# Patient Record
Sex: FEMALE | Race: OTHER
[De-identification: names, ages, dates, MRNs, and addresses within clinical notes are randomized per-mention and may not be internally consistent; named-entity substitution may affect disease eponyms.]

---

## 2019-10-29 ENCOUNTER — HOSPITAL ENCOUNTER (INPATIENT)
Dept: HOSPITAL 72 - EMR | Age: 69
LOS: 4 days | Discharge: HOME HEALTH | DRG: 140 | End: 2019-11-02
Payer: MEDICARE

## 2019-10-29 VITALS — SYSTOLIC BLOOD PRESSURE: 144 MMHG | DIASTOLIC BLOOD PRESSURE: 83 MMHG

## 2019-10-29 VITALS — HEIGHT: 64 IN | BODY MASS INDEX: 50.02 KG/M2 | WEIGHT: 293 LBS

## 2019-10-29 DIAGNOSIS — N17.9: ICD-10-CM

## 2019-10-29 DIAGNOSIS — R73.9: ICD-10-CM

## 2019-10-29 DIAGNOSIS — I10: ICD-10-CM

## 2019-10-29 DIAGNOSIS — D72.829: ICD-10-CM

## 2019-10-29 DIAGNOSIS — J44.1: Primary | ICD-10-CM

## 2019-10-29 DIAGNOSIS — E66.01: ICD-10-CM

## 2019-10-29 DIAGNOSIS — T38.0X5A: ICD-10-CM

## 2019-10-29 DIAGNOSIS — Z87.891: ICD-10-CM

## 2019-10-29 LAB
ADD MANUAL DIFF: NO
ALBUMIN SERPL-MCNC: 3.1 G/DL (ref 3.4–5)
ALBUMIN/GLOB SERPL: 0.6 {RATIO} (ref 1–2.7)
ALP SERPL-CCNC: 110 U/L (ref 46–116)
ALT SERPL-CCNC: 17 U/L (ref 12–78)
ANION GAP SERPL CALC-SCNC: 7 MMOL/L (ref 5–15)
APTT BLD: 31 SEC (ref 23–33)
AST SERPL-CCNC: 13 U/L (ref 15–37)
BASOPHILS NFR BLD AUTO: 0.9 % (ref 0–2)
BILIRUB SERPL-MCNC: 0.2 MG/DL (ref 0.2–1)
BUN SERPL-MCNC: 16 MG/DL (ref 7–18)
CALCIUM SERPL-MCNC: 9.5 MG/DL (ref 8.5–10.1)
CHLORIDE SERPL-SCNC: 104 MMOL/L (ref 98–107)
CK SERPL-CCNC: 47 U/L (ref 26–308)
CO2 SERPL-SCNC: 29 MMOL/L (ref 21–32)
CREAT SERPL-MCNC: 1.1 MG/DL (ref 0.55–1.3)
EOSINOPHIL NFR BLD AUTO: 1.4 % (ref 0–3)
ERYTHROCYTE [DISTWIDTH] IN BLOOD BY AUTOMATED COUNT: 13 % (ref 11.6–14.8)
GLOBULIN SER-MCNC: 5.2 G/DL
HCT VFR BLD CALC: 36.9 % (ref 37–47)
HGB BLD-MCNC: 11.9 G/DL (ref 12–16)
INR PPP: 1 (ref 0.9–1.1)
LYMPHOCYTES NFR BLD AUTO: 12.3 % (ref 20–45)
MCV RBC AUTO: 83 FL (ref 80–99)
MONOCYTES NFR BLD AUTO: 4.1 % (ref 1–10)
NEUTROPHILS NFR BLD AUTO: 81.3 % (ref 45–75)
PLATELET # BLD: 374 K/UL (ref 150–450)
POTASSIUM SERPL-SCNC: 4.2 MMOL/L (ref 3.5–5.1)
RBC # BLD AUTO: 4.42 M/UL (ref 4.2–5.4)
SODIUM SERPL-SCNC: 140 MMOL/L (ref 136–145)
WBC # BLD AUTO: 13.1 K/UL (ref 4.8–10.8)

## 2019-10-29 PROCEDURE — 94640 AIRWAY INHALATION TREATMENT: CPT

## 2019-10-29 PROCEDURE — 99285 EMERGENCY DEPT VISIT HI MDM: CPT

## 2019-10-29 PROCEDURE — 83605 ASSAY OF LACTIC ACID: CPT

## 2019-10-29 PROCEDURE — 81001 URINALYSIS AUTO W/SCOPE: CPT

## 2019-10-29 PROCEDURE — 80053 COMPREHEN METABOLIC PANEL: CPT

## 2019-10-29 PROCEDURE — 80048 BASIC METABOLIC PNL TOTAL CA: CPT

## 2019-10-29 PROCEDURE — 82550 ASSAY OF CK (CPK): CPT

## 2019-10-29 PROCEDURE — 83880 ASSAY OF NATRIURETIC PEPTIDE: CPT

## 2019-10-29 PROCEDURE — 36415 COLL VENOUS BLD VENIPUNCTURE: CPT

## 2019-10-29 PROCEDURE — 85610 PROTHROMBIN TIME: CPT

## 2019-10-29 PROCEDURE — 71045 X-RAY EXAM CHEST 1 VIEW: CPT

## 2019-10-29 PROCEDURE — 85730 THROMBOPLASTIN TIME PARTIAL: CPT

## 2019-10-29 PROCEDURE — 93306 TTE W/DOPPLER COMPLETE: CPT

## 2019-10-29 PROCEDURE — 96374 THER/PROPH/DIAG INJ IV PUSH: CPT

## 2019-10-29 PROCEDURE — 84484 ASSAY OF TROPONIN QUANT: CPT

## 2019-10-29 PROCEDURE — 85025 COMPLETE CBC W/AUTO DIFF WBC: CPT

## 2019-10-29 PROCEDURE — 93005 ELECTROCARDIOGRAM TRACING: CPT

## 2019-10-29 RX ADMIN — ALBUTEROL SULFATE SCH MG: 2.5 SOLUTION RESPIRATORY (INHALATION) at 21:08

## 2019-10-29 RX ADMIN — ALBUTEROL SULFATE SCH MG: 2.5 SOLUTION RESPIRATORY (INHALATION) at 21:13

## 2019-10-29 RX ADMIN — ALBUTEROL SULFATE SCH MG: 2.5 SOLUTION RESPIRATORY (INHALATION) at 21:34

## 2019-10-29 NOTE — EMERGENCY ROOM REPORT
History of Present Illness


General


Chief Complaint:  Upper Respiratory Illness


Source:  Patient





Present Illness


HPI


Patient presents with severe dyspnea with wheezing.  She has a history of 

asthma.  Has been using an inhaler.  She is not taking prednisone at this time.

  She states that this is the worst attack that she is ever had.  She denies 

any color to her phlegm.





The patient has had some upper back pain which is worsened with the cough.  She 

rates this pain 6/10 and aching.  It is pleuritic.  He is taken no medication 

for this pain.  The pain does not radiate.  He denies calf pain or edema.





No fevers, chills, sore throat, chest pain, palpitations, nausea, vomiting, 

diarrhea, dysuria, abdominal pain, joint pain, rashes, depression, anxiety, 

visual changes, dizziness, headache.


Allergies:  


Coded Allergies:  


     No Known Allergies (Unverified , 10/29/19)





Patient History


Past Medical History:  see triage record


Social History:  Denies: smoking, alcohol use, drug use


Social History Narrative


with daughter -


Reviewed Nursing Documentation:  PMH: Agreed; PSxH: Agreed





Nursing Documentation-PMH


Hx Asthma:  Yes





Review of Systems


All Other Systems:  negative except mentioned in HPI





Physical Exam





Vital Signs








  Date Time  Temp Pulse Resp B/P (MAP) Pulse Ox O2 Delivery O2 Flow Rate FiO2


 


10/29/19 20:17 98.2 88 18 144/83 (103) 97 Room Air  








Sp02 EP Interpretation:  reviewed, normal


General Appearance:  well appearing, no apparent distress, GCS 15


Head:  normocephalic


Eyes:  bilateral eye normal inspection, bilateral eye PERRL, bilateral eye EOMI


ENT:  moist mucus membranes


Neck:  supple


Respiratory:  chest non-tender, respiratory distress - Mild with exertion, 

wheezing, expiration, inspiration


Cardiovascular #1:  regular rate, rhythm


Cardiovascular #2:  2+ radial (R)


Gastrointestinal:  normal inspection, normal bowel sounds, non tender, no mass, 

non-distended, overweight


Musculoskeletal:  normal range of motion, no calf tenderness, Sarah's Sign 

negative, tender - Upper back


Neurologic:  alert, oriented x3, grossly normal


Psychiatric:  mood/affect normal


Skin:  no rash, warm/dry





Medical Decision Making


Diagnostic Impression:  


 Primary Impression:  


 COPD exacerbation


ER Course


Patient presents with severe dyspnea and wheezing.  Differential includes acute 

myocardial infarction, COPD exacerbation, pneumonia, asthma exacerbation, 

pulmonary embolus amongst others.  Clinically she does not have a PE at this 

time.  Evaluation with EKG, chest x-ray and labs.  The patient will be treated 

with Solu-Medrol and breathing treatments.





EKG without injury.  Chest x-ray COPD without infiltrate.  Leukocytosis.





Improved with breathing treatments but still with expiratory wheezes and 

dyspnea on exertion.  Patient needs continued breathing treatments and 

observation.





Admit [facility] floor Dr. Nolasco.





Laboratory Tests








Test


  10/29/19


21:00


 


White Blood Count


  13.1 K/UL


(4.8-10.8)  H


 


Red Blood Count


  4.42 M/UL


(4.20-5.40)


 


Hemoglobin


  11.9 G/DL


(12.0-16.0)  L


 


Hematocrit


  36.9 %


(37.0-47.0)  L


 


Mean Corpuscular Volume 83 FL (80-99)  


 


Mean Corpuscular Hemoglobin


  26.9 PG


(27.0-31.0)  L


 


Mean Corpuscular Hemoglobin


Concent 32.3 G/DL


(32.0-36.0)


 


Red Cell Distribution Width


  13.0 %


(11.6-14.8)


 


Platelet Count


  374 K/UL


(150-450)


 


Mean Platelet Volume


  5.6 FL


(6.5-10.1)  L


 


Neutrophils (%) (Auto)


  81.3 %


(45.0-75.0)  H


 


Lymphocytes (%) (Auto)


  12.3 %


(20.0-45.0)  L


 


Monocytes (%) (Auto)


  4.1 %


(1.0-10.0)


 


Eosinophils (%) (Auto)


  1.4 %


(0.0-3.0)


 


Basophils (%) (Auto)


  0.9 %


(0.0-2.0)


 


Prothrombin Time


  10.3 SEC


(9.30-11.50)


 


Prothrombin Time INR 1.0 (0.9-1.1)  


 


PTT


  31 SEC (23-33)


 


 


Sodium Level


  140 MMOL/L


(136-145)


 


Potassium Level


  4.2 MMOL/L


(3.5-5.1)


 


Chloride Level


  104 MMOL/L


()


 


Carbon Dioxide Level


  29 MMOL/L


(21-32)


 


Anion Gap


  7 mmol/L


(5-15)


 


Blood Urea Nitrogen


  16 mg/dL


(7-18)


 


Creatinine


  1.1 MG/DL


(0.55-1.30)


 


Estimate Glomerular


Filtration Rate 49.3 mL/min


(>60)


 


Glucose Level


  120 MG/DL


()  H


 


Lactic Acid Level


  1.00 mmol/L


(0.4-2.0)


 


Calcium Level


  9.5 MG/DL


(8.5-10.1)


 


Total Bilirubin


  0.2 MG/DL


(0.2-1.0)


 


Aspartate Amino Transferase


(AST) 13 U/L (15-37)


L


 


Alanine Aminotransferase (ALT)


  17 U/L (12-78)


 


 


Alkaline Phosphatase


  110 U/L


()


 


Total Creatine Kinase


  47 U/L


()


 


Troponin I


  0.000 ng/mL


(0.000-0.056)


 


Pro-B-Type Natriuretic Peptide


  217 pg/mL


(0-125)  H


 


Total Protein


  8.3 G/DL


(6.4-8.2)  H


 


Albumin


  3.1 G/DL


(3.4-5.0)  L


 


Globulin 5.2 g/dL  


 


Albumin/Globulin Ratio


  0.6 (1.0-2.7)


L








EKG Diagnostic Results


Rate:  normal


Rhythm:  NSR


ST Segments:  no acute changes





Rhythm Strip Diag. Results


EP Interpretation:  yes


Rhythm:  NSR, no PVC's, no ectopy





Chest X-Ray Diagnostic Results


Chest X-Ray Diagnostic Results :  


   Chest X-Ray Ordered:  Yes


   # of Views/Limited/Complete:  1 View


   Indication:  Shortness of Breath


   EP Interpretation:  Yes


   Interpretation:  no consolidation, no effusion, no pneumothorax, other - COPD


   Impression:  Other


   Electronically Signed by:  Electronically signed by Khanh Rodriguez MD





Last Vital Signs








  Date Time  Temp Pulse Resp B/P (MAP) Pulse Ox O2 Delivery O2 Flow Rate FiO2


 


10/30/19 00:00 98.6 112 18 144/81 (102) 100   


 


10/29/19 23:34      Room Air  


 


10/29/19 21:38        21








Status:  improved


Disposition:  ADMITTED AS INPATIENT


Condition:  Serious











Khanh Rodriguez MD Oct 29, 2019 20:39

## 2019-10-29 NOTE — NUR
NURSE NOTES:

Pt arrived in the unit. AAOX4. Able to make needs known. On room air. No c/o 
pain/discomfort. IV site is patent and intact. Skin is intact. Belongings checked. Bed in 
lowest position. Bed alarm is on Call light within reach. Will continue to monitor.

## 2019-10-29 NOTE — NUR
ED Nurse Note:



Patient came with wheelchair c/o wheezes and lower back pain arouind 1830. 6/10 
aching pain in mid back, no trauma; pt stated she was resting in bed. Pt has 
wheezes with difficutly breathing; pt stated she takes her inhaler but not 
effective. AAO x4, VSS at this time, skin is dry warm to touch.

## 2019-10-30 VITALS — DIASTOLIC BLOOD PRESSURE: 82 MMHG | SYSTOLIC BLOOD PRESSURE: 157 MMHG

## 2019-10-30 VITALS — SYSTOLIC BLOOD PRESSURE: 144 MMHG | DIASTOLIC BLOOD PRESSURE: 81 MMHG

## 2019-10-30 VITALS — DIASTOLIC BLOOD PRESSURE: 93 MMHG | SYSTOLIC BLOOD PRESSURE: 152 MMHG

## 2019-10-30 VITALS — SYSTOLIC BLOOD PRESSURE: 167 MMHG | DIASTOLIC BLOOD PRESSURE: 100 MMHG

## 2019-10-30 VITALS — DIASTOLIC BLOOD PRESSURE: 92 MMHG | SYSTOLIC BLOOD PRESSURE: 177 MMHG

## 2019-10-30 VITALS — SYSTOLIC BLOOD PRESSURE: 159 MMHG | DIASTOLIC BLOOD PRESSURE: 82 MMHG

## 2019-10-30 LAB
ADD MANUAL DIFF: NO
ANION GAP SERPL CALC-SCNC: 13 MMOL/L (ref 5–15)
BUN SERPL-MCNC: 16 MG/DL (ref 7–18)
CALCIUM SERPL-MCNC: 9.6 MG/DL (ref 8.5–10.1)
CHLORIDE SERPL-SCNC: 104 MMOL/L (ref 98–107)
CO2 SERPL-SCNC: 23 MMOL/L (ref 21–32)
CREAT SERPL-MCNC: 1.4 MG/DL (ref 0.55–1.3)
ERYTHROCYTE [DISTWIDTH] IN BLOOD BY AUTOMATED COUNT: 14.5 % (ref 11.6–14.8)
HCT VFR BLD CALC: 37.7 % (ref 37–47)
HGB BLD-MCNC: 11.8 G/DL (ref 12–16)
MCV RBC AUTO: 85 FL (ref 80–99)
PLATELET # BLD: 427 K/UL (ref 150–450)
POTASSIUM SERPL-SCNC: 3.6 MMOL/L (ref 3.5–5.1)
RBC # BLD AUTO: 4.44 M/UL (ref 4.2–5.4)
SODIUM SERPL-SCNC: 140 MMOL/L (ref 136–145)
WBC # BLD AUTO: 13.2 K/UL (ref 4.8–10.8)

## 2019-10-30 RX ADMIN — METHYLPREDNISOLONE SODIUM SUCCINATE SCH MG: 125 INJECTION, POWDER, FOR SOLUTION INTRAMUSCULAR; INTRAVENOUS at 14:44

## 2019-10-30 RX ADMIN — METHYLPREDNISOLONE SODIUM SUCCINATE SCH MG: 125 INJECTION, POWDER, FOR SOLUTION INTRAMUSCULAR; INTRAVENOUS at 05:11

## 2019-10-30 RX ADMIN — HEPARIN SODIUM SCH UNITS: 5000 INJECTION INTRAVENOUS; SUBCUTANEOUS at 21:54

## 2019-10-30 RX ADMIN — METHYLPREDNISOLONE SODIUM SUCCINATE SCH MG: 40 INJECTION, POWDER, LYOPHILIZED, FOR SOLUTION INTRAMUSCULAR; INTRAVENOUS at 21:53

## 2019-10-30 RX ADMIN — HEPARIN SODIUM SCH UNITS: 5000 INJECTION INTRAVENOUS; SUBCUTANEOUS at 21:00

## 2019-10-30 RX ADMIN — HEPARIN SODIUM SCH UNITS: 5000 INJECTION INTRAVENOUS; SUBCUTANEOUS at 08:57

## 2019-10-30 NOTE — NUR
NURSE NOTES:

patient has had high blood pressure since admission. no hx of HTN. no home medication. 
notified DR. Hughes and received order consult with Dr. Celeste. RN called alee Vora 
and no answer at this time.

## 2019-10-30 NOTE — NUR
ST NOTES:



REFERRED FOR SWALLOW EVAL BY DR BRUNNER (PRIMARY MD DR NGUYEN), SEE FULL 

REPORT.



DYSPHAGIA RISK FACTORS FOR THIS 69 YEAR OLD AA AND ADVANCED AGED FEMALE:



ACUTE ISSUES: COPD WITH EXACERBATION INCREASED RESP RATE WITH PO INTAKE

SPONTANEOUSLY COUGH SOME ? UNRELATED TO PO INTAKE. LUNGS CLEAR NOW PER ER 

MD



H/O ASTHMA, BORDERLINE DIABETES.



NO POLST/ADVANCE DIRECTIVE REGARDING TUBE FEEDING PREFERENCES IF NEEDED.

AT HOME ON REGULAR TEXTURE DIET AND THIN LIQUIDS. (NO DENTITION/DENTURES).

FAMILY AND PT DENY SWALLOW PROBLEMS. NOW ON REGULAR TYPE DIET [facility] SOFT 

CHOPPED AND THIN LIQUIDS W/O REPORTED ASPIRATION PER FAMILY AND RN.

OK WITH MEDS PER RN BUT MOSTLY IV NOW. INTAKE POOR AS PATIENT DISLIKES 

FOOD AND FAMILY BRINGING IN FOOD SHE LIKES (FRIED POTATOES AND EGG 

MCMUFFIN)

PER RD NEEDS CCHO-LOW DIET.



PATIENT ALERT BUT NOT VERY VERBAL SLEEPY AND SOB AT REST (RESP RATE 

USUALLY 18). ABLE TO EXPRESS SOME NEEDS AND DENIES SWALLOW PROBLEMS.



INITIAL IMPRESSIONS:

S/S OF HIGH ASPIRATION RISK AND DYSPHAGIA MOSTLY WITH THIN LIQUIDS 

SEQUENTIAL SIPS.



GIVEN THIN LIQUIDS VIA STRAW SEQUENTIAL SIPS (3 0Z WATER RAFAEL SWALLOW 

PROTOCOL), ONLY ABLE TO TAKE A FEW SIPS AND RESP RATE GOES UP TO 21 AND 

NEEDS TO REST AND TAKE A BREATH, LAST SWALLOW MILDLY DELAYED.

NO OVERT ASPIRATION ? SILENT ASP RISK (LUNGS CLEAR NOW PER ER MD).



GIVEN PUREED TSP GROSSLY FUNCTIONAL SWALLOW, DIFFICULT TO PALPATE GIVEN 

EXCESS NECK TISSUE (OBESITY).



REFUSED MASTICATED SOLID BUT PER GRANDSON, ATE HALF OF EGG MCMUFFIN AND 

SOME FRIED POTATOES W/O OVERT ASPIRATION AND CHOKING.



RECOMMENDATIONS:

CONSIDER CONTINUE WITH PO INTAKE OF CURRENT [facility] SOFT CHOPPED DIET AND 

THIN LIQUIDS (ONE SIP AT A TIME ONLY RESP RATE BELOW 21) AND OTHER ASPIRATION PRECAUTIONS 
AND SUPERVISION/ASSIST WITH MEALS.



PER GARCIA, JO, CHANGE TO CCHO-LOW FOR NOW.



SKILLED DYSPHAGIA MANAGEMENT AND TX IF HAS A MOD BARIUM SWALLOW STUDY AND DEFICITS FOUND 
(CAN DO STUDY AS IP OR OP IF DC).



R/O COG-COM DEFICITS IF NEEDED (CHECK ORIENTATION WHEN MORE AWAKE)



EDUCATED/TRAINED FAMILY AND STAFF IN POSTED ASPIRATION PRECAUTIONS

## 2019-10-30 NOTE — PULMONOLOGY PROGRESS NOTE
Assessment/Plan


Assessment/Plan


Pulmonary Consultation





HPI


Patient is a 69 year old woman admiotted complaining of  severe dyspnea and 

wheezing.  She has a history of asthma.  Has been using PRN Albuyerol only.  

She is not taking prednisone at this time.  She states that this is the worst 

attack that she is ever had.  She denies any color to her phlegm





The patient has had some upper back pain which is worsened with the cough.





No fevers, chills, sore throat, chest pain, palpitations, nausea, vomiting, 

diarrhea, dysuria, abdominal pain, joint pain, rashes, depression, anxiety, 

visual changes, dizziness, headache.





Allergies:  


     No Known Allergies 





Past Medical History:  Asthma, Hypertension


Social History:  Denies: smoking, alcohol use, drug use





All Other Systems:  negative except mentioned in HPI





Physical Exam





Vital Signs Noted








  Date Time  Temp Pulse Resp B/P (MAP) Pulse Ox O2 Delivery O2 Flow Rate FiO2


 


10/29/19 20:17 98.2 88 18 144/83 (103) 97 Room Air  








General Appearance:  well appearing, no apparent distress, GCS 15


Head:  normocephalic


Eyes:  bilateral eye normal inspection, bilateral eye PERRL, bilateral eye EOMI


ENT:  moist mucus membranes


Neck:  supple


Respiratory:  chest non-tender, mild expiratory wheezing


Cardiovascular:  regular rate, rhythm, HS1, HS2, RRR


Gastrointestinal:  normal inspection, normal bowel sounds, non tender, no mass, 

non-distended, overweight


Musculoskeletal:  back normal, normal range of motion, no calf tenderness, Sarah

's Sign negative


Neurologic:  alert, oriented x3, grossly normal


Skin:  no rash, warm/dry, no edema





Impression:  


 COPD/Asthma exacerbation


 Possible Chest Infection


 History of Hypertension


 


Plan:


IV Solu-Medrol - wean as tolerated


HHN Q4


Leukocytosis.


Continue antibiotics


PTA meds


PPX


O2 PRN





Laboratory Tests








Test


  10/29/19


21:00


 


White Blood Count


  13.1 K/UL


(4.8-10.8)  H


 


Red Blood Count


  4.42 M/UL


(4.20-5.40)


 


Hemoglobin


  11.9 G/DL


(12.0-16.0)  L


 


Hematocrit


  36.9 %


(37.0-47.0)  L


 


Mean Corpuscular Volume 83 FL (80-99)  


 


Mean Corpuscular Hemoglobin


  26.9 PG


(27.0-31.0)  L


 


Mean Corpuscular Hemoglobin


Concent 32.3 G/DL


(32.0-36.0)


 


Red Cell Distribution Width


  13.0 %


(11.6-14.8)


 


Platelet Count


  374 K/UL


(150-450)


 


Mean Platelet Volume


  5.6 FL


(6.5-10.1)  L


 


Neutrophils (%) (Auto)


  81.3 %


(45.0-75.0)  H


 


Lymphocytes (%) (Auto)


  12.3 %


(20.0-45.0)  L


 


Monocytes (%) (Auto)


  4.1 %


(1.0-10.0)


 


Eosinophils (%) (Auto)


  1.4 %


(0.0-3.0)


 


Basophils (%) (Auto)


  0.9 %


(0.0-2.0)


 


Prothrombin Time


  10.3 SEC


(9.30-11.50)


 


Prothrombin Time INR 1.0 (0.9-1.1)  


 


PTT


  31 SEC (23-33)


 


 


Sodium Level


  140 MMOL/L


(136-145)


 


Potassium Level


  4.2 MMOL/L


(3.5-5.1)


 


Chloride Level


  104 MMOL/L


()


 


Carbon Dioxide Level


  29 MMOL/L


(21-32)


 


Anion Gap


  7 mmol/L


(5-15)


 


Blood Urea Nitrogen


  16 mg/dL


(7-18)


 


Creatinine


  1.1 MG/DL


(0.55-1.30)


 


Estimate Glomerular


Filtration Rate 49.3 mL/min


(>60)


 


Glucose Level


  120 MG/DL


()  H


 


Lactic Acid Level


  1.00 mmol/L


(0.4-2.0)


 


Calcium Level


  9.5 MG/DL


(8.5-10.1)


 


Total Bilirubin


  0.2 MG/DL


(0.2-1.0)


 


Aspartate Amino Transferase


(AST) 13 U/L (15-37)


L


 


Alanine Aminotransferase (ALT)


  17 U/L (12-78)


 


 


Alkaline Phosphatase


  110 U/L


()


 


Total Creatine Kinase


  47 U/L


()


 


Troponin I


  0.000 ng/mL


(0.000-0.056)


 


Pro-B-Type Natriuretic Peptide


  217 pg/mL


(0-125)  H


 


Total Protein


  8.3 G/DL


(6.4-8.2)  H


 


Albumin


  3.1 G/DL


(3.4-5.0)  L


 


Globulin 5.2 g/dL  


 


Albumin/Globulin Ratio


  0.6 (1.0-2.7)


L








EKG:


Rate:  normal


Rhythm:  NSR


ST Segments:  no acute changes





Chest X-Ray:  no consolidation, no effusion, no pneumothorax, other - COPD





Subjective


ROS Limited/Unobtainable:  No


Allergies:  


Coded Allergies:  


     No Known Allergies (Unverified , 10/29/19)





Objective





Last 24 Hour Vital Signs








  Date Time  Temp Pulse Resp B/P (MAP) Pulse Ox O2 Delivery O2 Flow Rate FiO2


 


10/30/19 11:27 97.7 97 22 177/92 (120) 98   


 


10/30/19 09:00      Room Air  


 


10/30/19 08:28 98.1 90 24 157/82 (107) 97   


 


10/30/19 05:41 98.5       


 


10/30/19 04:00 98.5 97 19 159/82 (107) 98   


 


10/30/19 00:00 98.6 112 18 144/81 (102) 100   


 


10/29/19 23:34      Room Air  


 


10/29/19 22:52 98.2 88 24 144/83 100 Room Air  21


 


10/29/19 22:52 98.2 88 24 144/83 100 Room Air  21


 


10/29/19 21:38  93 24  100 Room Air  21





  89 19  100   


 


10/29/19 21:15  89 19  100 Room Air  21





  85 18  100   


 


10/29/19 20:30 98.2 88 18 144/83 97 Room Air  


 


10/29/19 20:30  88 18   Room Air  


 


10/29/19 20:17 98.2 88 18 144/83 (103) 97 Room Air  


 


10/29/19 19:55  85 19  100 Room Air  21





  82 18  99   

















Intake and Output  


 


 10/29/19 10/30/19





 19:00 07:00


 


Intake Total  360 ml


 


Balance  360 ml


 


  


 


Intake Oral  360 ml


 


# Voids  2








Laboratory Tests


10/29/19 21:00: 


White Blood Count 13.1H, Red Blood Count 4.42, Hemoglobin 11.9L, Hematocrit 

36.9L, Mean Corpuscular Volume 83, Mean Corpuscular Hemoglobin 26.9L, Mean 

Corpuscular Hemoglobin Concent 32.3, Red Cell Distribution Width 13.0, Platelet 

Count 374, Mean Platelet Volume 5.6L, Neutrophils (%) (Auto) 81.3H, Lymphocytes 

(%) (Auto) 12.3L, Monocytes (%) (Auto) 4.1, Eosinophils (%) (Auto) 1.4, 

Basophils (%) (Auto) 0.9, Prothrombin Time 10.3, Prothromb Time International 

Ratio 1.0, Activated Partial Thromboplast Time 31, Sodium Level 140, Potassium 

Level 4.2, Chloride Level 104, Carbon Dioxide Level 29, Anion Gap 7, Blood Urea 

Nitrogen 16, Creatinine 1.1, Estimat Glomerular Filtration Rate 49.3, Glucose 

Level 120H, Lactic Acid Level 1.00, Calcium Level 9.5, Total Bilirubin 0.2, 

Aspartate Amino Transf (AST/SGOT) 13L, Alanine Aminotransferase (ALT/SGPT) 17, 

Alkaline Phosphatase 110, Total Creatine Kinase 47, Troponin I 0.000, Pro-B-

Type Natriuretic Peptide 217H, Total Protein 8.3H, Albumin 3.1L, Globulin 5.2, 

Albumin/Globulin Ratio 0.6L


10/30/19 04:59: 


White Blood Count 13.2H, Red Blood Count 4.44, Hemoglobin 11.8L, Hematocrit 37.7

, Mean Corpuscular Volume 85, Mean Corpuscular Hemoglobin 26.5L, Mean 

Corpuscular Hemoglobin Concent 31.2L, Red Cell Distribution Width 14.5, 

Platelet Count 427, Mean Platelet Volume 5.6L, Neutrophils (%) (Auto) , 

Lymphocytes (%) (Auto) , Monocytes (%) (Auto) , Eosinophils (%) (Auto) , 

Basophils (%) (Auto) , Sodium Level 140, Potassium Level 3.6, Chloride Level 104

, Carbon Dioxide Level 23, Anion Gap 13, Blood Urea Nitrogen 16, Creatinine 1.4H

, Estimat Glomerular Filtration Rate 37.2, Glucose Level 274#H, Calcium Level 

9.6





Current Medications








 Medications


  (Trade)  Dose


 Ordered  Sig/Brenda


 Route


 PRN Reason  Start Time


 Stop Time Status Last Admin


Dose Admin


 


 Acetaminophen


  (Tylenol)  650 mg  Q4H  PRN


 ORAL


 Mild Pain/Temp > 100.5  10/29/19 23:45


 11/28/19 23:44  10/30/19 05:11


 


 


 Albuterol/


 Ipratropium


  (Albuterol/


 Ipratropium)  3 ml  Q4H  PRN


 HHN


 Shortness of Breath  10/29/19 23:45


 11/3/19 23:44   


 


 


 Azithromycin 500


 mg/Dextrose  275 ml @ 


 275 mls/hr  Q24HRS


 IV


   10/30/19 11:00


 11/5/19 11:59  10/30/19 12:11


 


 


 Ceftriaxone


 Sodium 1 gm/


 Dextrose  55 ml @ 


 110 mls/hr  Q24H


 IVPB


   10/30/19 09:00


 11/6/19 08:59  10/30/19 10:04


 


 


 Heparin Sodium


  (Porcine)


  (Heparin 5000


 units/ml)  5,000 units  EVERY 12  HOURS


 SUBQ


   10/30/19 09:00


 11/29/19 08:59  10/30/19 08:57


 


 


 Methylprednisolone


 Sodium Succinate


  (Solu-MEDROL)  60 mg  EVERY 8  HOURS


 IVP


   10/30/19 06:00


 11/29/19 05:59  10/30/19 14:44


 


 


 Ondansetron HCl


  (Zofran)  4 mg  Q8H  PRN


 IVP


 Nausea & Vomiting  10/30/19 00:00


 11/29/19 00:00   


 

















Khanh Lynch MD Oct 30, 2019 16:15

## 2019-10-30 NOTE — NUR
patients blood pressure been elevated am and pm shift, 172/78, 152/98, per Dr. Nolasco need 
to reach out to Dr. Celeste, RN left a voice message at [phone] for Dr. Celeste.

## 2019-10-30 NOTE — NUR
ED Nurse Note:



Patient was admited to [facility] due to SOB, lower back pain, for observation. Patient 
was transfered to the unit via gurney, with all belongings. AAO x4, VSS at this 
time.

## 2019-10-30 NOTE — CONSULTATION
DATE OF CONSULTATION:  10/30/2019

INFECTIOUS DISEASES CONSULTATION



CONSULTING PHYSICIAN:  Alex Dupont M.D.



PRIMARY ATTENDING PHYSICIAN:  Isabella Nolasco M.D.



REASON FOR CONSULTATION:  COPD, asthma exacerbation.



HISTORY OF PRESENT ILLNESS:  This is a 69-year-old  female

admitted last night because of shortness of breath, wheezing.  She had

history of asthma, taking bronchodilator at home but it was not helpful.

The patient also has mild leukocytosis of 13.1 at the time of admission.



PAST MEDICAL HISTORY:  Significant for obesity, asthma, COPD.



ALLERGIES:  No known drug allergies.



MEDICATIONS:  Azithromycin, ceftriaxone, methylprednisone, heparin, Zofran,

Tylenol, albuterol ipratropium inhaler.



SOCIAL HISTORY:  Lives at home.  .  No history of alcohol, drug

abuse, or smoking.



REVIEW OF SYSTEMS:  Feels much better.  Still having slight shortness of

breath especially with moving.  No fever.  No sore throat.  No runny nose.

Small amount of coughing that is nonproductive.  No dysuria.  No pain.



PHYSICAL EXAMINATION:

VITAL SIGNS:  Temperature 97.7, pulse 97, blood pressure 177/92.

GENERAL APPEARANCE:  She is morbidly obese.

HEAD AND NECK:  Pink conjunctiva.

HEART:  Normal rate.

LUNGS:  Few wheezing on deep breathing.

ABDOMEN:  Obese, soft.

EXTREMITIES:  No edema.



LABORATORY AND DIAGNOSTIC DATA:  WBC 13.2, hemoglobin 11.8, hematocrit

37.7, and platelets is 427.  Sodium 140, potassium 3.6, chloride 104,

bicarbonate 23, BUN 16, and creatinine 1.4, glucose 274, albumin is 3.1.

Chest x-ray showed no acute disease.



IMPRESSION:

1. Asthma.

2. COPD exacerbation.

3. Leukocytosis.

4. Morbid obesity.

5. Hyperglycemia likely secondary to steroids.

6. Acute kidney injury.



RECOMMENDATION:  I agree with current treatment with ceftriaxone and

Zithromax.  The patient is to lose weight and tapering of steroids as soon

as possible.



At the end of my exam, I thank Dr. Nolasco, for involving me in the care

of this patient.









  ______________________________________________

  Alex Dupont M.D.





DR:  Kaity LEGGETT:  10/30/2019 15:48

T:  10/30/2019 21:45

JOB#:  [id number]

CC: Yes

## 2019-10-30 NOTE — DIAGNOSTIC IMAGING REPORT
Indication: Dyspnea

 

Comparison:  None

 

A single view chest radiograph was obtained.

 

Findings:

 

Cardiomediastinal silhouette is mildly prominent. Aorta mildly calcified. The lungs

are clear. Pulmonary vascularity is appropriate. The diaphragmatic contour is smooth

and costophrenic angles are sharp. No pleural effusions are identified. The bones are

unremarkable.

 

Impression: No acute findings

## 2019-10-30 NOTE — NUR
NURSE NOTES:

received report from RADHA Fox. patient in bed. alert. oriented. verbally responsive. no 
respiratory distress noted. no c/o pain a this time. IV on RAC20 saline. bed in the lowest 
position. call light within reach. will continue to provide plan of care.

## 2019-10-30 NOTE — NUR
HAND-OFF: 

Report given to RADHA Edwards. [] : No [No falls in past year] : Patient reported no falls in the past year [0] : 2) Feeling down, depressed, or hopeless: Not at all (0) [Change in mental status noted] : No change in mental status noted [Language] : denies difficulty with language [Handling Complex Tasks] : denies difficulty handling complex tasks [None] : None [With Family] : lives with family [Retired] : retired [Fully functional (bathing, dressing, toileting, transferring, walking, feeding)] : Fully functional (bathing, dressing, toileting, transferring, walking, feeding) [Fully functional (using the telephone, shopping, preparing meals, housekeeping, doing laundry, using] : Fully functional and needs no help or supervision to perform IADLs (using the telephone, shopping, preparing meals, housekeeping, doing laundry, using transportation, managing medications and managing finances) [Discussed at today's visit] : Advance Directives Discussed at today's visit

## 2019-10-30 NOTE — NUR
NURSE NOTES:

Received patient in bed, asleep, eyes closed, able to make her needs known, IV site is clean 
dry and intact, no acute distress noted, call light is within reach, bed is in low position, 
locked, alarm is on. Will continue to monitor for comfort and safety.

## 2019-10-31 VITALS — SYSTOLIC BLOOD PRESSURE: 158 MMHG | DIASTOLIC BLOOD PRESSURE: 85 MMHG

## 2019-10-31 VITALS — SYSTOLIC BLOOD PRESSURE: 131 MMHG | DIASTOLIC BLOOD PRESSURE: 80 MMHG

## 2019-10-31 VITALS — SYSTOLIC BLOOD PRESSURE: 152 MMHG | DIASTOLIC BLOOD PRESSURE: 90 MMHG

## 2019-10-31 VITALS — SYSTOLIC BLOOD PRESSURE: 133 MMHG | DIASTOLIC BLOOD PRESSURE: 81 MMHG

## 2019-10-31 VITALS — DIASTOLIC BLOOD PRESSURE: 98 MMHG | SYSTOLIC BLOOD PRESSURE: 159 MMHG

## 2019-10-31 VITALS — SYSTOLIC BLOOD PRESSURE: 149 MMHG | DIASTOLIC BLOOD PRESSURE: 87 MMHG

## 2019-10-31 VITALS — DIASTOLIC BLOOD PRESSURE: 84 MMHG | SYSTOLIC BLOOD PRESSURE: 124 MMHG

## 2019-10-31 RX ADMIN — METHYLPREDNISOLONE SODIUM SUCCINATE SCH MG: 40 INJECTION, POWDER, LYOPHILIZED, FOR SOLUTION INTRAMUSCULAR; INTRAVENOUS at 13:47

## 2019-10-31 RX ADMIN — HEPARIN SODIUM SCH UNITS: 5000 INJECTION INTRAVENOUS; SUBCUTANEOUS at 20:39

## 2019-10-31 RX ADMIN — HEPARIN SODIUM SCH UNITS: 5000 INJECTION INTRAVENOUS; SUBCUTANEOUS at 21:00

## 2019-10-31 RX ADMIN — HEPARIN SODIUM SCH UNITS: 5000 INJECTION INTRAVENOUS; SUBCUTANEOUS at 08:34

## 2019-10-31 RX ADMIN — AZITHROMYCIN DIHYDRATE SCH MG: 250 TABLET, FILM COATED ORAL at 08:29

## 2019-10-31 RX ADMIN — METHYLPREDNISOLONE SODIUM SUCCINATE SCH MG: 40 INJECTION, POWDER, LYOPHILIZED, FOR SOLUTION INTRAMUSCULAR; INTRAVENOUS at 05:32

## 2019-10-31 NOTE — NUR
NURSE NOTES:

Received report from RADHA Edwards. Pt is awake and alert, sitting up in bed. No complaints of 
pain or apparent distress noted. IV site intact. Bed locked in lowest position, side rails 
up, call light within reach. Will continue to monitor.

## 2019-10-31 NOTE — NUR
RD ASSESSMENT & RECOMMENDATIONS

SEE CARE ACTIVITY FOR COMPLETE ASSESSMENT



DAILY ESTIMATED NEEDS:

Needs based on COPD, OBESITY/ 73.75kg abw 

20-25  kcals/kg 

5971-7693  total kcals

1-1.5  g protein/kg

  g total protein 

25-30  mL/kg

4947-1089  total fluid mLs



NUTRITION DIAGNOSIS:

* Morbid obesity R/T lifestyle factors, excessive energy intake PTA as

evidenced by BMI >50.

* Altered nutrition related lab values R/T h/o "borderline diabetes," on

steroidal med, clinical condition as evidenced by elev BGs (274, 120), pt

on Solumedrol, elev BPs (159/98, 167/100), pt on hypotensive agents.





CURRENT DIET:CCHO LOW, mech soft chopped    



 



PO DIET RECOMMENDATIONS:

CCHO LOW, CARDIAC/ texture per SLP  



 



ADDITIONAL RECOMMENDATIONS:

* Standing wt as able, or calibrated bedscale wt 

    -> weekly wt monitoring given obesity 

* Check A1C for eval of glycemic control 

* Monitor BGs closely, need for NISS 

* Monitor for non-compliance with diet, educate prn 

* Diet education w/ pt as able- pt too sleepy this AM (10/31) 

                     -> discussed w/ spouse

## 2019-10-31 NOTE — PULMONOLOGY PROGRESS NOTE
Assessment/Plan


Assessment/Plan


Pulmonary Progress Note





HPI


Patient is a 69 year old woman admiotted complaining of  severe dyspnea and 

wheezing.  She has a history of asthma.  Has been using PRN Albuterol only. 





The patient previously had some upper back pain which is worsened with the 

cough. No infiltrates on CXR.





No fevers, chills, sore throat, chest pain, palpitations, nausea, vomiting, 

diarrhea, dysuria, abdominal pain, joint pain, rashes, depression, anxiety, 

visual changes, dizziness, headache.





Allergies:  


     No Known Allergies 





Past Medical History:  Asthma, HypertensionI





Physical Exam





Vital Signs Noted





General Appearance:  well appearing, no apparent distress, GCS 15


Head:  normocephalic


Eyes:  bilateral eye normal inspection, bilateral eye PERRL, bilateral eye EOMI


ENT:  moist mucus membranes


Neck:  supple


Respiratory:  chest non-tender, CTAB


Cardiovascular:  regular rate, rhythm, HS1, HS2, RRR


Gastrointestinal:  normal inspection, normal bowel sounds, non tender, no mass, 

non-distended, overweight


Musculoskeletal:  back normal, normal range of motion, no calf tenderness, Sarah

's Sign negative


Neurologic:  alert, oriented x3, grossly normal


Skin:  no rash, warm/dry, no edema





Impression:  


 COPD/Asthma exacerbation


 Possible Chest Infection


 No infiltrate on CXR


 History of Hypertension


 


Plan:


PO Prednisone - wean as tolerated


HHN Q4


Leukocytosis.


Continue PO antibiotics


PTA meds


PPX


O2 PRN





Laboratory Tests Noted








Test


  10/29/19


21:00


 


White Blood Count


  13.1 K/UL


(4.8-10.8)  H


 


Red Blood Count


  4.42 M/UL


(4.20-5.40)


 


Hemoglobin


  11.9 G/DL


(12.0-16.0)  L


 


Hematocrit


  36.9 %


(37.0-47.0)  L


 


Mean Corpuscular Volume 83 FL (80-99)  


 


Mean Corpuscular Hemoglobin


  26.9 PG


(27.0-31.0)  L


 


Mean Corpuscular Hemoglobin


Concent 32.3 G/DL


(32.0-36.0)


 


Red Cell Distribution Width


  13.0 %


(11.6-14.8)


 


Platelet Count


  374 K/UL


(150-450)


 


Mean Platelet Volume


  5.6 FL


(6.5-10.1)  L


 


Neutrophils (%) (Auto)


  81.3 %


(45.0-75.0)  H


 


Lymphocytes (%) (Auto)


  12.3 %


(20.0-45.0)  L


 


Monocytes (%) (Auto)


  4.1 %


(1.0-10.0)


 


Eosinophils (%) (Auto)


  1.4 %


(0.0-3.0)


 


Basophils (%) (Auto)


  0.9 %


(0.0-2.0)


 


Prothrombin Time


  10.3 SEC


(9.30-11.50)


 


Prothrombin Time INR 1.0 (0.9-1.1)  


 


PTT


  31 SEC (23-33)


 


 


Sodium Level


  140 MMOL/L


(136-145)


 


Potassium Level


  4.2 MMOL/L


(3.5-5.1)


 


Chloride Level


  104 MMOL/L


()


 


Carbon Dioxide Level


  29 MMOL/L


(21-32)


 


Anion Gap


  7 mmol/L


(5-15)


 


Blood Urea Nitrogen


  16 mg/dL


(7-18)


 


Creatinine


  1.1 MG/DL


(0.55-1.30)


 


Estimate Glomerular


Filtration Rate 49.3 mL/min


(>60)


 


Glucose Level


  120 MG/DL


()  H


 


Lactic Acid Level


  1.00 mmol/L


(0.4-2.0)


 


Calcium Level


  9.5 MG/DL


(8.5-10.1)


 


Total Bilirubin


  0.2 MG/DL


(0.2-1.0)


 


Aspartate Amino Transferase


(AST) 13 U/L (15-37)


L


 


Alanine Aminotransferase (ALT)


  17 U/L (12-78)


 


 


Alkaline Phosphatase


  110 U/L


()


 


Total Creatine Kinase


  47 U/L


()


 


Troponin I


  0.000 ng/mL


(0.000-0.056)


 


Pro-B-Type Natriuretic Peptide


  217 pg/mL


(0-125)  H


 


Total Protein


  8.3 G/DL


(6.4-8.2)  H


 


Albumin


  3.1 G/DL


(3.4-5.0)  L


 


Globulin 5.2 g/dL  


 


Albumin/Globulin Ratio


  0.6 (1.0-2.7)


L








EKG:


Rate:  normal


Rhythm:  NSR


ST Segments:  no acute changes





Chest X-Ray:  no consolidation, no effusion, no pneumothorax





Subjective


ROS Limited/Unobtainable:  No


Allergies:  


Coded Allergies:  


     No Known Allergies (Unverified , 10/29/19)





Objective





Last 24 Hour Vital Signs








  Date Time  Temp Pulse Resp B/P (MAP) Pulse Ox O2 Delivery O2 Flow Rate FiO2


 


10/31/19 22:15    151/76    


 


10/31/19 21:14      Room Air  


 


10/31/19 20:35      Room Air  21


 


10/31/19 20:00 98.1 96 20 133/81 (98) 98   


 


10/31/19 16:00 98.0 85 20 131/80 (97) 96   


 


10/31/19 13:56    124/84    


 


10/31/19 13:54  89  124/84 (97)    


 


10/31/19 12:00 97.5 74 20 152/90 (110) 97   


 


10/31/19 09:00      Room Air  


 


10/31/19 08:29  81  159/98    


 


10/31/19 08:00 97.4 81 20 159/98 (118) 95   


 


10/31/19 04:00 98.2 88 18 158/85 (109) 95   


 


10/31/19 00:00 98.4 94 18 149/87 (107) 97   

















Intake and Output  


 


 10/30/19 10/31/19





 19:00 07:00


 


Intake Total 865 ml 360 ml


 


Balance 865 ml 360 ml


 


  


 


Intake Oral 480 ml 360 ml


 


IV Total 385 ml 


 


# Voids 3 2


 


# Bowel Movements 1 











Current Medications








 Medications


  (Trade)  Dose


 Ordered  Sig/Brenda


 Route


 PRN Reason  Start Time


 Stop Time Status Last Admin


Dose Admin


 


 Acetaminophen


  (Tylenol)  650 mg  Q4H  PRN


 ORAL


 Mild Pain/Temp > 100.5  10/29/19 23:45


 11/28/19 23:44  10/30/19 05:11


 


 


 Albuterol/


 Ipratropium


  (Albuterol/


 Ipratropium)  3 ml  Q4H  PRN


 HHN


 Shortness of Breath  10/29/19 23:45


 11/3/19 23:44   


 


 


 Amlodipine


 Besylate


  (Norvasc)  10 mg  DAILY


 ORAL


   10/31/19 09:00


 11/30/19 08:59  10/31/19 08:29


 


 


 Azithromycin


  (Zithromax)  500 mg  DAILY


 ORAL


   10/31/19 09:00


 11/7/19 08:59  10/31/19 08:29


 


 


 Clonidine HCl


  (Catapres Tab)  0.1 mg  EVERY 8  HOURS


 ORAL


   10/31/19 14:00


 11/30/19 13:59  10/31/19 22:15


 


 


 Clonidine HCl


  (Catapres Tab)  0.1 mg  Q4H  PRN


 ORAL


 bp over 165 syst  10/30/19 22:00


 11/29/19 21:59   


 


 


 Heparin Sodium


  (Porcine)


  (Heparin 5000


 units/ml)  5,000 units  EVERY 12  HOURS


 SUBQ


   10/30/19 09:00


 11/29/19 08:59  10/31/19 08:34


 


 


 Ondansetron HCl


  (Zofran)  4 mg  Q8H  PRN


 IVP


 Nausea & Vomiting  10/30/19 00:00


 11/29/19 00:00   


 


 


 Prednisone


  (predniSONE)  40 mg  DAILY  ONCE


 ORAL


   11/1/19 09:00


 11/1/19 09:01   


 

















Khanh Lynch MD Oct 31, 2019 22:57

## 2019-10-31 NOTE — GENERAL PROGRESS NOTE
Assessment/Plan


Problem List:  


(1) COPD exacerbation


ICD Codes:  J44.1 - Chronic obstructive pulmonary disease with (acute) 

exacerbation


SNOMED:  359622984


Status:  progressing


Assessment/Plan:


no wheezing


afebrile


vitals stable


copd exacerbation


do nebs





Subjective


ROS Limited/Unobtainable:  Yes


Allergies:  


Coded Allergies:  


     No Known Allergies (Unverified , 10/29/19)





Objective





Last 24 Hour Vital Signs








  Date Time  Temp Pulse Resp B/P (MAP) Pulse Ox O2 Delivery O2 Flow Rate FiO2


 


10/31/19 21:14      Room Air  


 


10/31/19 20:35      Room Air  21


 


10/31/19 20:00 98.1 96 20 133/81 (98) 98   


 


10/31/19 16:00 98.0 85 20 131/80 (97) 96   


 


10/31/19 13:56    124/84    


 


10/31/19 13:54  89  124/84 (97)    


 


10/31/19 12:00 97.5 74 20 152/90 (110) 97   


 


10/31/19 09:00      Room Air  


 


10/31/19 08:29  81  159/98    


 


10/31/19 08:00 97.4 81 20 159/98 (118) 95   


 


10/31/19 04:00 98.2 88 18 158/85 (109) 95   


 


10/31/19 00:00 98.4 94 18 149/87 (107) 97   


 


10/30/19 22:06  78  151/76    

















Intake and Output  


 


 10/30/19 10/31/19





 19:00 07:00


 


Intake Total 865 ml 360 ml


 


Balance 865 ml 360 ml


 


  


 


Intake Oral 480 ml 360 ml


 


IV Total 385 ml 


 


# Voids 3 2


 


# Bowel Movements 1 








Height (Feet):  5


Height (Inches):  4.00


Weight (Pounds):  301


Cardiovascular:  regular rhythm


Respiratory/Chest:  lungs clear


Abdomen:  soft











Isabella Nolasco MD Oct 31, 2019 21:22

## 2019-10-31 NOTE — INFECTIOUS DISEASES PROG NOTE
Assessment/Plan


Assessment/Plan


IMPRESSION:


1. Asthma.


2. COPD exacerbation.


3. Leukocytosis.


4. Morbid obesity.


5. Hyperglycemia likely secondary to steroids.


6. Acute kidney injury.





RECOMMENDATION:  


Continue Zithromax


taper steroids


f/u labs





Subjective


ROS Limited/Unobtainable:  Yes


Constitutional:  Denies: fever


Allergies:  


Coded Allergies:  


     No Known Allergies (Unverified , 10/29/19)





Objective


Vital Signs





Last 24 Hour Vital Signs








  Date Time  Temp Pulse Resp B/P (MAP) Pulse Ox O2 Delivery O2 Flow Rate FiO2


 


10/31/19 09:00      Room Air  


 


10/31/19 08:29  81  159/98    


 


10/31/19 08:00 97.4 81 20 159/98 (118) 95   


 


10/31/19 04:00 98.2 88 18 158/85 (109) 95   


 


10/31/19 00:00 98.4 94 18 149/87 (107) 97   


 


10/30/19 22:06  78  151/76    


 


10/30/19 21:07      Room Air  


 


10/30/19 20:00 97.8 87 17 152/93 (112)    


 


10/30/19 19:44      Room Air  21


 


10/30/19 16:00 97.2 86 20 167/100 (122) 98   








Height (Feet):  5


Height (Inches):  4.00


Weight (Pounds):  301


General Appearance:  no acute distress


HEENT:  mucous membranes moist


Respiratory/Chest:  lungs clear


Cardiovascular:  normal rate


Abdomen:  soft, non tender


Extremities:  no edema


Neurologic/Psychiatric:  other - sleeping





Current Medications








 Medications


  (Trade)  Dose


 Ordered  Sig/Brenda


 Route


 PRN Reason  Start Time


 Stop Time Status Last Admin


Dose Admin


 


 Acetaminophen


  (Tylenol)  650 mg  Q4H  PRN


 ORAL


 Mild Pain/Temp > 100.5  10/29/19 23:45


 11/28/19 23:44  10/30/19 05:11


 


 


 Albuterol/


 Ipratropium


  (Albuterol/


 Ipratropium)  3 ml  Q4H  PRN


 HHN


 Shortness of Breath  10/29/19 23:45


 11/3/19 23:44   


 


 


 Amlodipine


 Besylate


  (Norvasc)  10 mg  DAILY


 ORAL


   10/31/19 09:00


 11/30/19 08:59  10/31/19 08:29


 


 


 Azithromycin


  (Zithromax)  500 mg  DAILY


 ORAL


   10/31/19 09:00


 11/7/19 08:59  10/31/19 08:29


 


 


 Clonidine HCl


  (Catapres Tab)  0.1 mg  Q4H  PRN


 ORAL


 bp over 165 syst  10/30/19 22:00


 11/29/19 21:59   


 


 


 Heparin Sodium


  (Porcine)


  (Heparin 5000


 units/ml)  5,000 units  EVERY 12  HOURS


 SUBQ


   10/30/19 09:00


 11/29/19 08:59  10/31/19 08:34


 


 


 Methylprednisolone


 Sodium Succinate


  (Solu-MEDROL)  40 mg  EVERY 8  HOURS


 IVP


   10/30/19 22:00


 11/29/19 05:59  10/31/19 05:32


 


 


 Ondansetron HCl


  (Zofran)  4 mg  Q8H  PRN


 IVP


 Nausea & Vomiting  10/30/19 00:00


 11/29/19 00:00   


 

















Alex Dupont MD Oct 31, 2019 11:48

## 2019-10-31 NOTE — NUR
*-* INSURANCE *-*



ALL CLINICALS HAVE BEEN FAXED TO:



[facility] Ref# or CM anel

Ph#[phone]

fax#[phone]



& 



[facility]

Ref#[id number]

ph#[phone]

fax#[phone]

## 2019-10-31 NOTE — HISTORY AND PHYSICAL REPORT
DATE OF ADMISSION:  10/29/2019

HISTORY OF PRESENT ILLNESS:  The patient is admitted for COPD exacerbation,

elevated WBC.  Reports that the patient has history of asthma.  Has been

complaining of wheezing, shortness of breath, and nonproductive cough for

the past couple of days.  Also, has chronic back pain.  Admitted for

asthma exacerbation.  Denies nausea, vomiting, or diarrhea.  Denies

heartburn.  Denies chills.



PAST MEDICAL HISTORY:  Asthma, chronic back pain.



PAST SURGICAL HISTORY:  , appendectomy.



SOCIAL HISTORY:  History of smoking.  No history of alcohol or illicit

drugs.



MEDICATIONS:  No known medications.



FAMILY HISTORY:  Noncontributory.



REVIEW OF SYSTEMS:  HEENT:  Denies headaches.  RESPIRATORY:  Reports

shortness of breath and nonproductive cough for a couple days of and

wheezing.    CARDIOVASCULAR:  Denies chest pain.  GI:  Denies nausea,

vomiting, diarrhea.    EXTREMITIES:  Does have chronic back pain.  CENTRAL

NERVOUS SYSTEM:  Denies change in vision or speech pattern.



PHYSICAL EXAMINATION:

VITAL SIGNS:  Temperature is 97.7, pulse 97, blood pressure 177/92.

HEENT:  PERRLA.

NECK:  Supple.  No lymphadenopathy.

CHEST:  Clear.  Bibasilar wheezing.

CARDIOVASCULAR:  Regular rate and rhythm.

ABDOMEN:  Soft.  Positive bowel sounds.  No organomegaly.

EXTREMITIES:  1+ edema.

NEUROLOGIC:  Reflexes on both sides.  Moves all four extremities.



LABORATORY DATA:  WBC of 13.1, hemoglobin 14.9, and platelets of 374.

Sodium 140, potassium 4.2, BUN of 16, creatinine 1.1, and glucose of

120.



ASSESSMENT AND PLAN:  Asthma exacerbation, respiratory insufficiency.  I

have asked Dr. Alex Dupont and  _____ to see the patient to rule out

pneumonia versus bronchitis and also for the management of asthma

exacerbation.









  ______________________________________________

  Isabella Nolasco M.D.





DR:  ARA

D:  10/30/2019 21:21

T:  10/31/2019 04:15

JOB#:  [id number]

CC:

## 2019-10-31 NOTE — CONSULTATION
Consult Note


Consult Note





asked to eval at the request of Dr Golden for BP management





interviewed


examined





Chief Complaint:  Upper Respiratory Illness





Patient presents with severe dyspnea with wheezing.  She has a history of 

asthma.  Has been using an inhaler.  She is not taking prednisone at this time.

  She states that this is the worst attack that she is ever had.  She denies 

any color to her phlegm.





The patient has had some upper back pain which is worsened with the cough.





No fevers, chills, sore throat, chest pain, palpitations, nausea, vomiting, 

diarrhea, dysuria, abdominal pain, joint pain, rashes, depression, anxiety, 

visual changes, dizziness, headache.


 No Known Allergies (Unverified , 10/29/19)





Assessment/Plan





exacerbation COPD-


HTN-


Anemia





BP meds


2 D echo


Anemia paul


per pulmonary


taper steroids as possible











Carlos Enrique Celeste MD Oct 31, 2019 13:33

## 2019-10-31 NOTE — NUR
NURSE NOTES:

Received patient in bed, family at bedside, patient is awake, alert  and oriented x4, able 
to make her needs known, IV site is clean dry and intact. Call light is within reach, bed is 
in low position, locked and alarm is on. Will continue to monitor for safety and comfort.

## 2019-10-31 NOTE — NUR
NURSE NOTES:

Patients IV has infiltrated, attempted to restart new IV, unsuccessful, patient is refusing 
it now, notified CN and MD.

## 2019-11-01 VITALS — DIASTOLIC BLOOD PRESSURE: 88 MMHG | SYSTOLIC BLOOD PRESSURE: 130 MMHG

## 2019-11-01 VITALS — DIASTOLIC BLOOD PRESSURE: 74 MMHG | SYSTOLIC BLOOD PRESSURE: 134 MMHG

## 2019-11-01 VITALS — SYSTOLIC BLOOD PRESSURE: 131 MMHG | DIASTOLIC BLOOD PRESSURE: 76 MMHG

## 2019-11-01 LAB
APPEARANCE UR: CLEAR
APTT PPP: (no result) S
GLUCOSE UR STRIP-MCNC: NEGATIVE MG/DL
KETONES UR QL STRIP: NEGATIVE
LEUKOCYTE ESTERASE UR QL STRIP: NEGATIVE
NITRITE UR QL STRIP: NEGATIVE
PH UR STRIP: 6 [PH] (ref 4.5–8)
PROT UR QL STRIP: NEGATIVE
SP GR UR STRIP: 1.01 (ref 1–1.03)
UROBILINOGEN UR-MCNC: NORMAL MG/DL (ref 0–1)

## 2019-11-01 RX ADMIN — HEPARIN SODIUM SCH UNITS: 5000 INJECTION INTRAVENOUS; SUBCUTANEOUS at 21:00

## 2019-11-01 RX ADMIN — HEPARIN SODIUM SCH UNITS: 5000 INJECTION INTRAVENOUS; SUBCUTANEOUS at 09:00

## 2019-11-01 RX ADMIN — AZITHROMYCIN DIHYDRATE SCH MG: 250 TABLET, FILM COATED ORAL at 08:32

## 2019-11-01 NOTE — NUR
*-* INSURANCE *-*



ALL CLINICALS HAVE BEEN FAXED TO:



[facility] Ref# or ZANE tam

Ph#[phone]

fax#[phone]



& 



[facility]

Ref#[id number]

ph#[phone]

## 2019-11-01 NOTE — PULMONOLOGY PROGRESS NOTE
Assessment/Plan


Assessment/Plan


Pulmonary Progress Note





HPI


Patient is a 69 year old woman admiotted complaining of  severe dyspnea and 

wheezing.  She has a history of asthma.  Has been using PRN Albuterol only. 





The patient previously had some upper back pain which is worsened with the 

cough. No infiltrates on CXR.





No fevers, chills, sore throat, chest pain, palpitations, nausea, vomiting, 

diarrhea, dysuria, abdominal pain, joint pain, rashes, depression, anxiety, 

visual changes, dizziness, headache.





Less SOB





Allergies:  


     No Known Allergies 





Past Medical History:  Asthma, Hypertension





Physical Exam





Vital Signs Noted





General Appearance:  well appearing, no apparent distress, GCS 15


Head:  normocephalic


Eyes:  bilateral eye normal inspection, bilateral eye PERRL, bilateral eye EOMI


ENT:  moist mucus membranes


Neck:  supple


Respiratory:  chest non-tender, CTAB


Cardiovascular:  regular rate, rhythm, HS1, HS2, RRR


Gastrointestinal:  normal inspection, normal bowel sounds, non tender, no mass, 

non-distended, overweight


Musculoskeletal:  back normal, normal range of motion, no calf tenderness, Sarah

's Sign negative


Neurologic:  alert, oriented x3, grossly normal


Skin:  no rash, warm/dry, no edema





Impression:  


 COPD/Asthma exacerbation


 Possible Chest Infection


 No infiltrate on CXR


 History of Hypertension


 


Plan:


PO Prednisone - wean as tolerated - Prednisone dose pack, Symbicort on DC (

Provided PPX)


HHN Q4


Leukocytosis.


Continue PO antibiotics


PTA meds


PPX


O2 PRN





Laboratory Tests Noted








Test


  10/29/19


21:00


 


White Blood Count


  13.1 K/UL


(4.8-10.8)  H


 


Red Blood Count


  4.42 M/UL


(4.20-5.40)


 


Hemoglobin


  11.9 G/DL


(12.0-16.0)  L


 


Hematocrit


  36.9 %


(37.0-47.0)  L


 


Mean Corpuscular Volume 83 FL (80-99)  


 


Mean Corpuscular Hemoglobin


  26.9 PG


(27.0-31.0)  L


 


Mean Corpuscular Hemoglobin


Concent 32.3 G/DL


(32.0-36.0)


 


Red Cell Distribution Width


  13.0 %


(11.6-14.8)


 


Platelet Count


  374 K/UL


(150-450)


 


Mean Platelet Volume


  5.6 FL


(6.5-10.1)  L


 


Neutrophils (%) (Auto)


  81.3 %


(45.0-75.0)  H


 


Lymphocytes (%) (Auto)


  12.3 %


(20.0-45.0)  L


 


Monocytes (%) (Auto)


  4.1 %


(1.0-10.0)


 


Eosinophils (%) (Auto)


  1.4 %


(0.0-3.0)


 


Basophils (%) (Auto)


  0.9 %


(0.0-2.0)


 


Prothrombin Time


  10.3 SEC


(9.30-11.50)


 


Prothrombin Time INR 1.0 (0.9-1.1)  


 


PTT


  31 SEC (23-33)


 


 


Sodium Level


  140 MMOL/L


(136-145)


 


Potassium Level


  4.2 MMOL/L


(3.5-5.1)


 


Chloride Level


  104 MMOL/L


()


 


Carbon Dioxide Level


  29 MMOL/L


(21-32)


 


Anion Gap


  7 mmol/L


(5-15)


 


Blood Urea Nitrogen


  16 mg/dL


(7-18)


 


Creatinine


  1.1 MG/DL


(0.55-1.30)


 


Estimate Glomerular


Filtration Rate 49.3 mL/min


(>60)


 


Glucose Level


  120 MG/DL


()  H


 


Lactic Acid Level


  1.00 mmol/L


(0.4-2.0)


 


Calcium Level


  9.5 MG/DL


(8.5-10.1)


 


Total Bilirubin


  0.2 MG/DL


(0.2-1.0)


 


Aspartate Amino Transferase


(AST) 13 U/L (15-37)


L


 


Alanine Aminotransferase (ALT)


  17 U/L (12-78)


 


 


Alkaline Phosphatase


  110 U/L


()


 


Total Creatine Kinase


  47 U/L


()


 


Troponin I


  0.000 ng/mL


(0.000-0.056)


 


Pro-B-Type Natriuretic Peptide


  217 pg/mL


(0-125)  H


 


Total Protein


  8.3 G/DL


(6.4-8.2)  H


 


Albumin


  3.1 G/DL


(3.4-5.0)  L


 


Globulin 5.2 g/dL  


 


Albumin/Globulin Ratio


  0.6 (1.0-2.7)


L








EKG:


Rate:  normal


Rhythm:  NSR


ST Segments:  no acute changes





Chest X-Ray:  no consolidation, no effusion, no pneumothorax





Subjective


ROS Limited/Unobtainable:  No


Allergies:  


Coded Allergies:  


     No Known Allergies (Unverified , 10/29/19)





Objective





Last 24 Hour Vital Signs








  Date Time  Temp Pulse Resp B/P (MAP) Pulse Ox O2 Delivery O2 Flow Rate FiO2


 


11/1/19 14:52    139/69    


 


11/1/19 12:00 98.3 94 19 134/74 (94) 98   


 


11/1/19 08:33  92  130/88    


 


11/1/19 08:00 98.3 92 19 130/88 (102) 98   


 


11/1/19 05:25    134/78    


 


10/31/19 22:15    151/76    


 


10/31/19 21:14      Room Air  


 


10/31/19 20:35      Room Air  21


 


10/31/19 20:00 98.1 96 20 133/81 (98) 98   

















Intake and Output  


 


 10/31/19 11/1/19





 18:59 06:59


 


Intake Total 720 ml 


 


Balance 720 ml 


 


  


 


Intake Oral 720 ml 


 


# Voids 3 








Laboratory Tests


11/1/19 08:30: 


Urine Color Pale yellow, Urine Appearance Clear, Urine pH 6, Urine Specific 

Gravity 1.015, Urine Protein Negative, Urine Glucose (UA) Negative, Urine 

Ketones Negative, Urine Blood Negative, Urine Nitrite Negative, Urine Bilirubin 

Negative, Urine Urobilinogen Normal, Urine Leukocyte Esterase Negative, Urine 

RBC 0-2, Urine WBC 0-2, Urine Squamous Epithelial Cells Few, Urine Bacteria Few





Current Medications








 Medications


  (Trade)  Dose


 Ordered  Sig/Brenda


 Route


 PRN Reason  Start Time


 Stop Time Status Last Admin


Dose Admin


 


 Acetaminophen


  (Tylenol)  650 mg  Q4H  PRN


 ORAL


 Mild Pain/Temp > 100.5  10/29/19 23:45


 11/28/19 23:44  10/30/19 05:11


 


 


 Albuterol/


 Ipratropium


  (Albuterol/


 Ipratropium)  3 ml  Q4H  PRN


 HHN


 Shortness of Breath  10/29/19 23:45


 11/3/19 23:44   


 


 


 Amlodipine


 Besylate


  (Norvasc)  10 mg  DAILY


 ORAL


   10/31/19 09:00


 11/30/19 08:59  11/1/19 08:33


 


 


 Azithromycin


  (Zithromax)  500 mg  DAILY


 ORAL


   10/31/19 09:00


 11/7/19 08:59  11/1/19 08:32


 


 


 Clonidine HCl


  (Catapres Tab)  0.1 mg  EVERY 8  HOURS


 ORAL


   10/31/19 14:00


 11/30/19 13:59  11/1/19 14:52


 


 


 Clonidine HCl


  (Catapres Tab)  0.1 mg  Q4H  PRN


 ORAL


 bp over 165 syst  10/30/19 22:00


 11/29/19 21:59   


 


 


 Heparin Sodium


  (Porcine)


  (Heparin 5000


 units/ml)  5,000 units  EVERY 12  HOURS


 SUBQ


   10/30/19 09:00


 11/29/19 08:59  10/31/19 08:34


 


 


 Ondansetron HCl


  (Zofran)  4 mg  Q8H  PRN


 IVP


 Nausea & Vomiting  10/30/19 00:00


 11/29/19 00:00   


 

















Khanh Lynch MD Nov 1, 2019 16:07

## 2019-11-01 NOTE — NEPHROLOGY PROGRESS NOTE
Assessment/Plan


Problem List:  


(1) COPD exacerbation


(2) Anemia


(3) HTN (hypertension)


Assessment





exacerbation COPD-


HTN-


Anemia


Plan


refused blood draw


BP meds


2 D echo


Anemia paul


per pulmonary


taper steroids as possible





Subjective


ROS Limited/Unobtainable:  No


Constitutional:  Reports: malaise, weakness





Objective


Objective





Last 24 Hour Vital Signs








  Date Time  Temp Pulse Resp B/P (MAP) Pulse Ox O2 Delivery O2 Flow Rate FiO2


 


11/1/19 08:33  92  130/88    


 


11/1/19 05:25    134/78    


 


10/31/19 22:15    151/76    


 


10/31/19 21:14      Room Air  


 


10/31/19 20:35      Room Air  21


 


10/31/19 20:00 98.1 96 20 133/81 (98) 98   


 


10/31/19 16:00 98.0 85 20 131/80 (97) 96   


 


10/31/19 13:56    124/84    


 


10/31/19 13:54  89  124/84 (97)    


 


10/31/19 12:00 97.5 74 20 152/90 (110) 97   

















Intake and Output  


 


 10/31/19 11/1/19





 19:00 07:00


 


Intake Total 720 ml 


 


Balance 720 ml 


 


  


 


Intake Oral 720 ml 


 


# Voids 3 








Laboratory Tests


11/1/19 08:30: 


Urine Color Pale yellow, Urine Appearance Clear, Urine pH 6, Urine Specific 

Gravity 1.015, Urine Protein Negative, Urine Glucose (UA) Negative, Urine 

Ketones Negative, Urine Blood Negative, Urine Nitrite Negative, Urine Bilirubin 

Negative, Urine Urobilinogen Normal, Urine Leukocyte Esterase Negative, Urine 

RBC 0-2, Urine WBC 0-2, Urine Squamous Epithelial Cells Few, Urine Bacteria Few


Height (Feet):  5


Height (Inches):  4.00


Weight (Pounds):  301


General Appearance:  no apparent distress


Cardiovascular:  tachycardia


Respiratory/Chest:  decreased breath sounds


Abdomen:  distended, other - obese











Carlos Enrique Celeste MD Nov 1, 2019 11:19

## 2019-11-01 NOTE — GENERAL PROGRESS NOTE
Assessment/Plan


Problem List:  


(1) COPD exacerbation


ICD Codes:  J44.1 - Chronic obstructive pulmonary disease with (acute) 

exacerbation


SNOMED:  224444644


Status:  progressing


Assessment/Plan:





copd exacerbation is improving


afebrile


reviewed chart


not hypoxic





Subjective


ROS Limited/Unobtainable:  Yes


Allergies:  


Coded Allergies:  


     No Known Allergies (Unverified , 10/29/19)





Objective





Last 24 Hour Vital Signs








  Date Time  Temp Pulse Resp B/P (MAP) Pulse Ox O2 Delivery O2 Flow Rate FiO2


 


11/1/19 16:00 97.5 75 19 131/76 (94) 99   


 


11/1/19 14:52    139/69    


 


11/1/19 12:00 98.3 94 19 134/74 (94) 98   


 


11/1/19 09:00      Room Air  


 


11/1/19 08:33  92  130/88    


 


11/1/19 08:00 98.3 92 19 130/88 (102) 98   


 


11/1/19 05:25    134/78    


 


10/31/19 22:15    151/76    


 


10/31/19 21:14      Room Air  

















Intake and Output  


 


 10/31/19 11/1/19





 19:00 07:00


 


Intake Total 720 ml 


 


Balance 720 ml 


 


  


 


Intake Oral 720 ml 


 


# Voids 3 








Laboratory Tests


11/1/19 08:30: 


Urine Color Pale yellow, Urine Appearance Clear, Urine pH 6, Urine Specific 

Gravity 1.015, Urine Protein Negative, Urine Glucose (UA) Negative, Urine 

Ketones Negative, Urine Blood Negative, Urine Nitrite Negative, Urine Bilirubin 

Negative, Urine Urobilinogen Normal, Urine Leukocyte Esterase Negative, Urine 

RBC 0-2, Urine WBC 0-2, Urine Squamous Epithelial Cells Few, Urine Bacteria Few


Height (Feet):  5


Height (Inches):  4.00


Weight (Pounds):  301


Cardiovascular:  normal rate


Respiratory/Chest:  lungs clear


Abdomen:  soft











Isabella Nolasco MD Nov 1, 2019 21:06

## 2019-11-01 NOTE — INFECTIOUS DISEASES PROG NOTE
Assessment/Plan


Assessment/Plan


IMPRESSION:


1. Asthma.


2. COPD exacerbation.


3. Leukocytosis.


4. Morbid obesity.


5. Hyperglycemia likely secondary to steroids.


6. Acute kidney injury.





RECOMMENDATION:  


Continue Zithromax


f/u labs





Subjective


ROS Limited/Unobtainable:  No


Constitutional:  Reports: no symptoms


Respiratory:  Reports: no symptoms


Cardiovascular:  Reports: no symptoms


Gastrointestinal/Abdominal:  Reports: no symptoms


Genitourinary:  Reports: no symptoms


Allergies:  


Coded Allergies:  


     No Known Allergies (Unverified , 10/29/19)





Objective


Vital Signs





Last 24 Hour Vital Signs








  Date Time  Temp Pulse Resp B/P (MAP) Pulse Ox O2 Delivery O2 Flow Rate FiO2


 


11/1/19 12:00 98.3 94 19 134/74 (94) 98   


 


11/1/19 08:33  92  130/88    


 


11/1/19 08:00 98.3 92 19 130/88 (102) 98   


 


11/1/19 05:25    134/78    


 


10/31/19 22:15    151/76    


 


10/31/19 21:14      Room Air  


 


10/31/19 20:35      Room Air  21


 


10/31/19 20:00 98.1 96 20 133/81 (98) 98   


 


10/31/19 16:00 98.0 85 20 131/80 (97) 96   


 


10/31/19 13:56    124/84    


 


10/31/19 13:54  89  124/84 (97)    








Height (Feet):  5


Height (Inches):  4.00


Weight (Pounds):  301


General Appearance:  other - obese


Respiratory/Chest:  lungs clear


Cardiovascular:  normal rate


Abdomen:  soft, non tender


Extremities:  no edema


Neurologic/Psychiatric:  alert, responsive





Laboratory Tests








Test


  11/1/19


08:30


 


Urine Color Pale yellow  


 


Urine Appearance Clear  


 


Urine pH 6 (4.5-8.0)  


 


Urine Specific Gravity


  1.015


(1.005-1.035)


 


Urine Protein


  Negative


(NEGATIVE)


 


Urine Glucose (UA)


  Negative


(NEGATIVE)


 


Urine Ketones


  Negative


(NEGATIVE)


 


Urine Blood


  Negative


(NEGATIVE)


 


Urine Nitrite


  Negative


(NEGATIVE)


 


Urine Bilirubin


  Negative


(NEGATIVE)


 


Urine Urobilinogen


  Normal MG/DL


(0.0-1.0)


 


Urine Leukocyte Esterase


  Negative


(NEGATIVE)


 


Urine RBC


  0-2 /HPF (0 -


2)


 


Urine WBC


  0-2 /HPF (0 -


2)


 


Urine Squamous Epithelial


Cells Few /LPF


(NONE/OCC)


 


Urine Bacteria


  Few /HPF


(NONE)











Current Medications








 Medications


  (Trade)  Dose


 Ordered  Sig/Brenda


 Route


 PRN Reason  Start Time


 Stop Time Status Last Admin


Dose Admin


 


 Acetaminophen


  (Tylenol)  650 mg  Q4H  PRN


 ORAL


 Mild Pain/Temp > 100.5  10/29/19 23:45


 11/28/19 23:44  10/30/19 05:11


 


 


 Albuterol/


 Ipratropium


  (Albuterol/


 Ipratropium)  3 ml  Q4H  PRN


 HHN


 Shortness of Breath  10/29/19 23:45


 11/3/19 23:44   


 


 


 Amlodipine


 Besylate


  (Norvasc)  10 mg  DAILY


 ORAL


   10/31/19 09:00


 11/30/19 08:59  11/1/19 08:33


 


 


 Azithromycin


  (Zithromax)  500 mg  DAILY


 ORAL


   10/31/19 09:00


 11/7/19 08:59  11/1/19 08:32


 


 


 Clonidine HCl


  (Catapres Tab)  0.1 mg  EVERY 8  HOURS


 ORAL


   10/31/19 14:00


 11/30/19 13:59  11/1/19 05:25


 


 


 Clonidine HCl


  (Catapres Tab)  0.1 mg  Q4H  PRN


 ORAL


 bp over 165 syst  10/30/19 22:00


 11/29/19 21:59   


 


 


 Heparin Sodium


  (Porcine)


  (Heparin 5000


 units/ml)  5,000 units  EVERY 12  HOURS


 SUBQ


   10/30/19 09:00


 11/29/19 08:59  10/31/19 08:34


 


 


 Ondansetron HCl


  (Zofran)  4 mg  Q8H  PRN


 IVP


 Nausea & Vomiting  10/30/19 00:00


 11/29/19 00:00   


 

















Alex Dupont MD Nov 1, 2019 13:28

## 2019-11-01 NOTE — NUR
NURSE NOTES:

received report form RADHA Edwards,. pATIENT

-------------------------------------------------------------------------------

Addendum: 11/01/19 at 0804 by Cassia Noel RN

-------------------------------------------------------------------------------

Patient in bed, eating breakfast. On room air, no signs of distress or labored breathing. NO 
IV access, MD aware. Bed in lowest position with call light in reach. Will continue with 
plan of care.

## 2019-11-01 NOTE — NUR
DISCHARGE SWALLOW/SPEECH THERAPY SUMMARY:



SEEN FOR DYSPHAGIA, SEE SWALLOW EVAL REPORT. PATIENT INTAKE POOR FOR 

DOWNGRADED DIET AND SHE IS EATING MASTICATED SOLID FOODS FROM OUTSIDE OF 

HOSPITAL (AGAINST MEDICAL ADVICE). PER RD PT ON A CARDIAC AND CCHO-LOW 

DIET TYPE. WILL CLEAR PT FOR SOFT CHEW DIET SINCE SHE REPORTEDLY CAN 

TOLERATE THIS CONSISTENTLY. PATIENT NOT RECEPTIVE TO MOD BARIUM SWALLOW 

STUDY.



PATIENT AND HER  ADVISED TO FOLLOW ASPIRATION PRECAUTIONS AS IT 

RELATES TO SOB (NEEDS TO REST SO SHE DOES NOT INHALE THIN LIQUIDS)



STAFF EDUCATED/UPDATED IN ASPIRATION PRECAUTIONS AND DIET CHANGES.



PLAN:

D/C FROM [facility] SINCE SWALLOW IS GROSSLY FUNCTIONAL

## 2019-11-01 NOTE — NUR
*-* DISCHARGE PLANNING *-*



PATIENT HAS BEEN REFERRED TO:



[facility]

P: [phone]

F: [phone]

## 2019-11-01 NOTE — NUR
NURSE NOTES:

Received patient in bed, patient is awake, alert  and oriented. Verbally able to make her 
needs known. Breathing on room air. No SOB or acute distress noted. Call light is within 
reach, bed is in low position, locked and alarm is on. Will continue to monitor for safety 
and comfort.

## 2019-11-02 VITALS — SYSTOLIC BLOOD PRESSURE: 131 MMHG | DIASTOLIC BLOOD PRESSURE: 72 MMHG

## 2019-11-02 VITALS — DIASTOLIC BLOOD PRESSURE: 70 MMHG | SYSTOLIC BLOOD PRESSURE: 134 MMHG

## 2019-11-02 VITALS — DIASTOLIC BLOOD PRESSURE: 72 MMHG | SYSTOLIC BLOOD PRESSURE: 131 MMHG

## 2019-11-02 VITALS — DIASTOLIC BLOOD PRESSURE: 74 MMHG | SYSTOLIC BLOOD PRESSURE: 124 MMHG

## 2019-11-02 VITALS — DIASTOLIC BLOOD PRESSURE: 67 MMHG | SYSTOLIC BLOOD PRESSURE: 115 MMHG

## 2019-11-02 RX ADMIN — HEPARIN SODIUM SCH UNITS: 5000 INJECTION INTRAVENOUS; SUBCUTANEOUS at 08:28

## 2019-11-02 RX ADMIN — AZITHROMYCIN DIHYDRATE SCH MG: 250 TABLET, FILM COATED ORAL at 08:27

## 2019-11-02 NOTE — PULMONOLOGY PROGRESS NOTE
Assessment/Plan


Assessment/Plan


Pulmonary Progress Note





HPI


Patient is a 69 year old woman admiotted complaining of  severe dyspnea and 

wheezing.  She has a history of asthma.  Has been using PRN Albuterol only. 





The patient previously had some upper back pain which is worsened with the 

cough. No infiltrates on CXR.





No fevers, chills, sore throat, chest pain, palpitations, nausea, vomiting, 

diarrhea, dysuria, abdominal pain, joint pain, rashes, depression, anxiety, 

visual changes, dizziness, headache.





Less SOB





Allergies:  


     No Known Allergies 





Past Medical History:  Asthma, Hypertension





Physical Exam





Vital Signs Noted





General Appearance:  well appearing, no apparent distress, GCS 15


Head:  normocephalic


Eyes:  bilateral eye normal inspection, bilateral eye PERRL, bilateral eye EOMI


ENT:  moist mucus membranes


Neck:  supple


Respiratory:  chest non-tender, CTAB


Cardiovascular:  regular rate, rhythm, HS1, HS2, RRR


Gastrointestinal:  normal inspection, normal bowel sounds, non tender, no mass, 

non-distended, overweight


Musculoskeletal:  back normal, normal range of motion, no calf tenderness, Sarah

's Sign negative


Neurologic:  alert, oriented x3, grossly normal


Skin:  no rash, warm/dry, no edema





Impression:  


 COPD/Asthma exacerbation


 Possible Chest Infection


 No infiltrate on CXR


 History of Hypertension


 


Plan:


PO Prednisone - wean as tolerated - Prednisone dose pack, Symbicort on DC (

Provided PPX)


HHN Q4


Leukocytosis.


Continue PO antibiotics


PTA meds


PPX


O2 PRN





Laboratory Tests Noted








Test


  10/29/19


21:00


 


White Blood Count


  13.1 K/UL


(4.8-10.8)  H


 


Red Blood Count


  4.42 M/UL


(4.20-5.40)


 


Hemoglobin


  11.9 G/DL


(12.0-16.0)  L


 


Hematocrit


  36.9 %


(37.0-47.0)  L


 


Mean Corpuscular Volume 83 FL (80-99)  


 


Mean Corpuscular Hemoglobin


  26.9 PG


(27.0-31.0)  L


 


Mean Corpuscular Hemoglobin


Concent 32.3 G/DL


(32.0-36.0)


 


Red Cell Distribution Width


  13.0 %


(11.6-14.8)


 


Platelet Count


  374 K/UL


(150-450)


 


Mean Platelet Volume


  5.6 FL


(6.5-10.1)  L


 


Neutrophils (%) (Auto)


  81.3 %


(45.0-75.0)  H


 


Lymphocytes (%) (Auto)


  12.3 %


(20.0-45.0)  L


 


Monocytes (%) (Auto)


  4.1 %


(1.0-10.0)


 


Eosinophils (%) (Auto)


  1.4 %


(0.0-3.0)


 


Basophils (%) (Auto)


  0.9 %


(0.0-2.0)


 


Prothrombin Time


  10.3 SEC


(9.30-11.50)


 


Prothrombin Time INR 1.0 (0.9-1.1)  


 


PTT


  31 SEC (23-33)


 


 


Sodium Level


  140 MMOL/L


(136-145)


 


Potassium Level


  4.2 MMOL/L


(3.5-5.1)


 


Chloride Level


  104 MMOL/L


()


 


Carbon Dioxide Level


  29 MMOL/L


(21-32)


 


Anion Gap


  7 mmol/L


(5-15)


 


Blood Urea Nitrogen


  16 mg/dL


(7-18)


 


Creatinine


  1.1 MG/DL


(0.55-1.30)


 


Estimate Glomerular


Filtration Rate 49.3 mL/min


(>60)


 


Glucose Level


  120 MG/DL


()  H


 


Lactic Acid Level


  1.00 mmol/L


(0.4-2.0)


 


Calcium Level


  9.5 MG/DL


(8.5-10.1)


 


Total Bilirubin


  0.2 MG/DL


(0.2-1.0)


 


Aspartate Amino Transferase


(AST) 13 U/L (15-37)


L


 


Alanine Aminotransferase (ALT)


  17 U/L (12-78)


 


 


Alkaline Phosphatase


  110 U/L


()


 


Total Creatine Kinase


  47 U/L


()


 


Troponin I


  0.000 ng/mL


(0.000-0.056)


 


Pro-B-Type Natriuretic Peptide


  217 pg/mL


(0-125)  H


 


Total Protein


  8.3 G/DL


(6.4-8.2)  H


 


Albumin


  3.1 G/DL


(3.4-5.0)  L


 


Globulin 5.2 g/dL  


 


Albumin/Globulin Ratio


  0.6 (1.0-2.7)


L








EKG:


Rate:  normal


Rhythm:  NSR


ST Segments:  no acute changes





Chest X-Ray:  no consolidation, no effusion, no pneumothorax





Subjective


ROS Limited/Unobtainable:  No


Allergies:  


Coded Allergies:  


     No Known Allergies (Unverified , 10/29/19)





Objective





Last 24 Hour Vital Signs








  Date Time  Temp Pulse Resp B/P (MAP) Pulse Ox O2 Delivery O2 Flow Rate FiO2


 


11/2/19 14:16    131/72    


 


11/2/19 12:00 98.1 82 18 131/72 (91) 96   


 


11/2/19 09:00      Room Air  


 


11/2/19 08:27  75  134/70    


 


11/2/19 08:00 97.7 75 20 134/70 (91) 94   


 


11/2/19 05:34    121/70    


 


11/2/19 04:00 97.2 76 19 124/74 (91) 100   


 


11/2/19 00:00 97.6 70 19 115/67 (83) 100   


 


11/1/19 22:09    134/74    


 


11/1/19 21:00      Room Air  


 


11/1/19 20:00 97.7 79 19 134/74 (94) 100   

















Intake and Output  


 


 11/1/19 11/2/19





 18:59 06:59


 


Intake Total  360 ml


 


Balance  360 ml


 


  


 


Intake Oral  360 ml


 


# Voids 2 3


 


# Bowel Movements 1 

















Khanh Lynch MD Nov 2, 2019 17:23

## 2019-11-02 NOTE — NUR
NURSE NOTES:

patient discharged to home with fair condition by family member. no respiratory distress 
noted no pain at this time.  No IV access. MD is aware. RN removed ID. Provided dc packet. 
RN checked and counted belonging with patient and obtained sign. home health will visit the 
patient on 11/3/19 as MD ordered. skin intact. RN assisted the patient to the lobby. patient 
left safely by family car.

## 2019-11-02 NOTE — NUR
CASE MANAGEMENT: REVIEW



69Y/FEMALE PRESENTED TO ED FROM HOME 



CC: WHEEZING . BACK PAIN 





SI: COPD . ASTHMA 

T 98.2 HR 85 RR 18 /83 SAT 97% ROOM AIR 

WBC 13.1 







IS: SOLU MEDROL IV X1

ATROVENT HHN X1

ALBUTEROL HHN X1

CEFTRIAXONE IV X1 





***PATIENT ADMITTED TO MED/SURG UNIT 11/01/2019***

DCP: PATIENT IS FROM HOME

## 2019-11-02 NOTE — NEPHROLOGY PROGRESS NOTE
Assessment/Plan


Problem List:  


(1) HTN (hypertension)


Assessment:  stable





(2) COPD exacerbation


(3) Anemia


Assessment





exacerbation COPD-


HTN-


Anemia


Plan


refused blood draw


BP meds


2 D echo


Anemia paul


per pulmonary


taper steroids as possible


not much to add from renal stand





Subjective


ROS Limited/Unobtainable:  No





Objective


Objective





Last 24 Hour Vital Signs








  Date Time  Temp Pulse Resp B/P (MAP) Pulse Ox O2 Delivery O2 Flow Rate FiO2


 


11/2/19 08:27  75  134/70    


 


11/2/19 08:00 97.7 75 20 134/70 (91) 94   


 


11/2/19 05:34    121/70    


 


11/2/19 04:00 97.2 76 19 124/74 (91) 100   


 


11/2/19 00:00 97.6 70 19 115/67 (83) 100   


 


11/1/19 22:09    134/74    


 


11/1/19 21:00      Room Air  


 


11/1/19 20:00 97.7 79 19 134/74 (94) 100   


 


11/1/19 16:00 97.5 75 19 131/76 (94) 99   


 


11/1/19 14:52    139/69    


 


11/1/19 12:00 98.3 94 19 134/74 (94) 98   

















Intake and Output  


 


 11/1/19 11/2/19





 19:00 07:00


 


Intake Total  360 ml


 


Balance  360 ml


 


  


 


Intake Oral  360 ml


 


# Voids 2 3


 


# Bowel Movements 1 








Height (Feet):  5


Height (Inches):  4.00


Weight (Pounds):  301


General Appearance:  no apparent distress


Cardiovascular:  normal rate


Abdomen:  other - obese











Carlos Enrique Celeste MD Nov 2, 2019 09:56

## 2019-11-02 NOTE — GENERAL PROGRESS NOTE
Assessment/Plan


Problem List:  


(1) COPD exacerbation


ICD Codes:  J44.1 - Chronic obstructive pulmonary disease with (acute) 

exacerbation


SNOMED:  627393231


Status:  progressing


Assessment/Plan:





copd exacerbation is improved


dc home w 


for vitals sign monitering


see dc summary for details





Subjective


ROS Limited/Unobtainable:  Yes


Allergies:  


Coded Allergies:  


     No Known Allergies (Unverified , 10/29/19)





Objective





Last 24 Hour Vital Signs








  Date Time  Temp Pulse Resp B/P (MAP) Pulse Ox O2 Delivery O2 Flow Rate FiO2


 


11/2/19 14:16    131/72    


 


11/2/19 12:00 98.1 82 18 131/72 (91) 96   


 


11/2/19 09:00      Room Air  


 


11/2/19 08:27  75  134/70    


 


11/2/19 08:00 97.7 75 20 134/70 (91) 94   


 


11/2/19 05:34    121/70    


 


11/2/19 04:00 97.2 76 19 124/74 (91) 100   


 


11/2/19 00:00 97.6 70 19 115/67 (83) 100   


 


11/1/19 22:09    134/74    


 


11/1/19 21:00      Room Air  


 


11/1/19 20:00 97.7 79 19 134/74 (94) 100   


 


11/1/19 16:00 97.5 75 19 131/76 (94) 99   

















Intake and Output  


 


 11/1/19 11/2/19





 19:00 07:00


 


Intake Total  360 ml


 


Balance  360 ml


 


  


 


Intake Oral  360 ml


 


# Voids 2 3


 


# Bowel Movements 1 








Height (Feet):  5


Height (Inches):  4.00


Weight (Pounds):  301


Cardiovascular:  normal rate


Respiratory/Chest:  lungs clear


Abdomen:  soft











Isabella Nolasco MD Nov 2, 2019 15:01

## 2019-11-02 NOTE — NUR
NURSE NOTES:

received report from RADHA Escamilla. patient in bed. alert. oriented. verbally responsive. no 
respiratory distress noted. no c/o pain at this time. dc planing to home with home health. 
No IV site. MD is aware. patient does not want any needle procedures. no isolation. 
ambulatory. bed in the lowest position and locked. call light within reach. will continue to 
provide plan of care.

## 2019-11-02 NOTE — NUR
NURSE NOTES:

patient refused heparin sq. RN explained risks and benefits. Patient said no any kind of 
needle procedures.

## 2019-11-04 NOTE — DISCHARGE SUMMARY
Discharge Summary


Discharge Summary


_


DATE OF ADMISSION: 10/29/2019





DATE OF DISCHARGE: 11/02/2019








DISCHARGED BY: Dr. Nolasco





REASON FOR ADMISSION: 


69 years old female with past medical history of asthma, borderline diabetes, 

morbid obesity, presented to emergency department with wheezing and severe 

dyspnea.  


Patient reported the worst attack, she ever had.  


Patient reported  minimally productive cough with colored phlegm.  


Patient reported upper back pain , worsened with cough . Pain described as 

aching,  pleuritic,  6 out of 10 on a scale 1-10.  No radiation.  


No calf pain or edema.  


Upon evaluation pulse oximetry was 97% on room air , patient was afebrile.  


Laboratory work-up revealed mild leukocytosis with  WBC 13.1, hemoglobin 11.9 , 

hematocrit 36.9 , platelet count 374.


Stable electrolytes and renal parameters. 


Lactic acid 1.0.  


Glucose 120.  


Stable LFT.  


Troponin negative.  pro .  EKG revealed sinus rhythm , no acute ischemic 

changes.  


Chest x-ray demonstrated no acute cardiopulmonary pathology.  


In emergency department patient received nebulizing treatment with 

bronchodilator , started on IV  Solu-Medrol  and  subsequently admitted for 

further management.


 


CONSULTANTS:


pulmonary Dr. Syed VÁSQUEZ specialist Dr. Quesada


nephrologist Dr. Celeste


 


 


[facility] COURSE: 


Patient admitted and started on IV steroids with gradual tapering down.  


Patient started on empiric antibiotic due to leukocytosis.  


Bronchodilator treatment provided around-the-clock and as needed.  


Supplemental oxygen titrated to keep pulse oximetry above 92%.  Pulse oximetry 

remained stable on room air.  


DVT prophylaxis provided.  


Patient gradually was able to  be tapered  from IV steroid and changed to oral 

prednisone.  





Patient was noted to have  elevated blood pressure.


Patient had an echocardiogram due to elevated blood pressure , which revealed 

preserved ejection fraction , no evidence of wall motion abnormality to the 

extent visualized.  


Right ventricular systolic pressure of 14.  Mild left ventricular hypertrophy.  


Blood pressure was managed with calcium channel blocker , and clonidine was on 

board as needed.  


Blood pressure stabilized  with current regimen prior to discharge 131/72.  





Patient was continued on empiric antibiotic for possible infection.  No fevers.


Patient noted to have hyperglycemia,  likely secondary to steroids.  


Patient provided no concentrated sweets diet. Steroids  tapered fast as 

permitted. 


Renal parameters and  electrolytes were closely monitored.  


Creatinine up to 1.4, likely due to steroids, IV steroids changed to oral /for 

Medrol dose pack. 


Repeat creatinine as outpatient. 





Patient clinically stabilized and was ready for discharge home with home health 

services.  


Prescription for Medrol Dosepak and Symbicort inhaler provided by 

pulmonologist.   











FINAL DIAGNOSES:


COPD exacerbation


Asthma


Hypertension


Morbid obesity


Leukocytosis


Acute kidney injury


Hyperglycemia , likely secondary to steroid











DISCHARGE MEDICATIONS:


See Medication Reconciliation list.





DISCHARGE INSTRUCTIONS:


Patient was discharged home with home health services.  


Follow up with primary care provider in one week.





I have been assigned to dictate discharge summary for this account. 


I was not involved in the patient's management.











Fern Garcia NP Nov 4, 2019 08:05

## 2019-11-04 NOTE — NUR
*-* INSURANCE *-*



ALL CLINICALS HAVE BEEN FAXED TO:



[facility] Ref# or ZANE yet

Ph#[phone]

fax#[phone]



& 



[employer]

Ref#[id number]

ph#[phone]

-------------------------------------------------------------------------------

Addendum: 11/05/19 at 1415 by CLAUDIA SHANNON CM

-------------------------------------------------------------------------------

DISCHARGE SUMMARY FAXED

## 2019-11-04 NOTE — CARDIOLOGY REPORT
--------------- APPROVED REPORT --------------





EXAM: Two-dimensional and M-mode echocardiogram with Doppler and color Doppler.



INDICATION

Congestive Heart Failure 



M-Mode DIMENSIONS 

IVSd1.3 (0.7-1.1cm)Left Atrium (MM)4.2 (1.6-4.0cm)

LVDd5.1 (3.5-5.6cm)Aortic Root3.6 (2.0-3.7cm)

PWd1.0 (0.7-1.1cm)Aortic Cusp Exc.1.9 (1.5-2.0cm)

IVSs1.8 cm

LVDs3.0 (2.5-4.0cm)

PWs1.2 cm





Technically difficult study due to pt's body habitus.

Normal left ventricular chamber size, systolic function and abnormal wall motion to 

extent visualized.

Left ventricular ejection fraction estimated to be 55 %.

Mild left ventricular hypertrophy.

Anterior Echo-free space, may be due to pericardial fat or effusion.

All other cardiac chamber sizes are within normal limits. 

Focal aortic valve sclerosis with adequate cusp excursion.

Thickened mitral valve leaflets with normal excursion.

Mitral annulus and aortic root calcification.

Normal pulmonic valve structure.

Normal tricuspid valve structure. 

IVC at normal size with physiologic collapse.



A  color flow and spectral Doppler study was performed and revealed:

No aortic insufficiency.

Trace mitral regurgitation.

Mitral diastolic velocities suggest reduced left ventricular relaxation c/w mild LV 

diastolic dysfunction (Grade I )

Mild tricuspid regurgitation.

Tricuspid  systolic velocities suggests peak right ventricular systolic pressure of 14  

mmHg.

## 2020-02-20 ENCOUNTER — HOSPITAL ENCOUNTER (INPATIENT)
Dept: HOSPITAL 72 - EMR | Age: 70
LOS: 4 days | Discharge: LEFT BEFORE DISCHARGE | DRG: 315 | End: 2020-02-24
Payer: MEDICAID

## 2020-02-20 VITALS — WEIGHT: 264 LBS | BODY MASS INDEX: 46.78 KG/M2 | HEIGHT: 63 IN

## 2020-02-20 VITALS — DIASTOLIC BLOOD PRESSURE: 67 MMHG | SYSTOLIC BLOOD PRESSURE: 118 MMHG

## 2020-02-20 VITALS — DIASTOLIC BLOOD PRESSURE: 70 MMHG | SYSTOLIC BLOOD PRESSURE: 113 MMHG

## 2020-02-20 VITALS — SYSTOLIC BLOOD PRESSURE: 121 MMHG | DIASTOLIC BLOOD PRESSURE: 95 MMHG

## 2020-02-20 VITALS — DIASTOLIC BLOOD PRESSURE: 71 MMHG | SYSTOLIC BLOOD PRESSURE: 115 MMHG

## 2020-02-20 VITALS — SYSTOLIC BLOOD PRESSURE: 130 MMHG | DIASTOLIC BLOOD PRESSURE: 87 MMHG

## 2020-02-20 VITALS — SYSTOLIC BLOOD PRESSURE: 137 MMHG | DIASTOLIC BLOOD PRESSURE: 78 MMHG
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
## 2020-02-20 DIAGNOSIS — E11.65: ICD-10-CM

## 2020-02-20 DIAGNOSIS — J45.901: ICD-10-CM

## 2020-02-20 DIAGNOSIS — S52.501A: Primary | ICD-10-CM

## 2020-02-20 DIAGNOSIS — D63.8: ICD-10-CM

## 2020-02-20 DIAGNOSIS — T38.0X5A: ICD-10-CM

## 2020-02-20 DIAGNOSIS — E66.01: ICD-10-CM

## 2020-02-20 LAB
ADD MANUAL DIFF: NO
ALBUMIN SERPL-MCNC: 3.1 G/DL (ref 3.4–5)
ALBUMIN/GLOB SERPL: 0.7 {RATIO} (ref 1–2.7)
ALP SERPL-CCNC: 83 U/L (ref 46–116)
ALT SERPL-CCNC: 17 U/L (ref 12–78)
ANION GAP SERPL CALC-SCNC: 8 MMOL/L (ref 5–15)
AST SERPL-CCNC: 16 U/L (ref 15–37)
BASOPHILS NFR BLD AUTO: 0.9 % (ref 0–2)
BILIRUB SERPL-MCNC: 0.2 MG/DL (ref 0.2–1)
BUN SERPL-MCNC: 17 MG/DL (ref 7–18)
CALCIUM SERPL-MCNC: 9.1 MG/DL (ref 8.5–10.1)
CHLORIDE SERPL-SCNC: 105 MMOL/L (ref 98–107)
CO2 SERPL-SCNC: 28 MMOL/L (ref 21–32)
CREAT SERPL-MCNC: 1.3 MG/DL (ref 0.55–1.3)
EOSINOPHIL NFR BLD AUTO: 3.3 % (ref 0–3)
ERYTHROCYTE [DISTWIDTH] IN BLOOD BY AUTOMATED COUNT: 14.3 % (ref 11.6–14.8)
GLOBULIN SER-MCNC: 4.5 G/DL
HCT VFR BLD CALC: 38.1 % (ref 37–47)
HGB BLD-MCNC: 12.1 G/DL (ref 12–16)
LYMPHOCYTES NFR BLD AUTO: 19.4 % (ref 20–45)
MCV RBC AUTO: 84 FL (ref 80–99)
MONOCYTES NFR BLD AUTO: 5.9 % (ref 1–10)
NEUTROPHILS NFR BLD AUTO: 70.5 % (ref 45–75)
PLATELET # BLD: 353 K/UL (ref 150–450)
POTASSIUM SERPL-SCNC: 4.7 MMOL/L (ref 3.5–5.1)
RBC # BLD AUTO: 4.53 M/UL (ref 4.2–5.4)
SODIUM SERPL-SCNC: 141 MMOL/L (ref 136–145)
WBC # BLD AUTO: 10.1 K/UL (ref 4.8–10.8)

## 2020-02-20 PROCEDURE — 85007 BL SMEAR W/DIFF WBC COUNT: CPT

## 2020-02-20 PROCEDURE — 83880 ASSAY OF NATRIURETIC PEPTIDE: CPT

## 2020-02-20 PROCEDURE — 94640 AIRWAY INHALATION TREATMENT: CPT

## 2020-02-20 PROCEDURE — 93306 TTE W/DOPPLER COMPLETE: CPT

## 2020-02-20 PROCEDURE — 99285 EMERGENCY DEPT VISIT HI MDM: CPT

## 2020-02-20 PROCEDURE — 94003 VENT MGMT INPAT SUBQ DAY: CPT

## 2020-02-20 PROCEDURE — 84484 ASSAY OF TROPONIN QUANT: CPT

## 2020-02-20 PROCEDURE — 94664 DEMO&/EVAL PT USE INHALER: CPT

## 2020-02-20 PROCEDURE — 80048 BASIC METABOLIC PNL TOTAL CA: CPT

## 2020-02-20 PROCEDURE — 94150 VITAL CAPACITY TEST: CPT

## 2020-02-20 PROCEDURE — 76000 FLUOROSCOPY <1 HR PHYS/QHP: CPT

## 2020-02-20 PROCEDURE — 85025 COMPLETE CBC W/AUTO DIFF WBC: CPT

## 2020-02-20 PROCEDURE — 80053 COMPREHEN METABOLIC PANEL: CPT

## 2020-02-20 PROCEDURE — 93005 ELECTROCARDIOGRAM TRACING: CPT

## 2020-02-20 PROCEDURE — 36415 COLL VENOUS BLD VENIPUNCTURE: CPT

## 2020-02-20 PROCEDURE — 71045 X-RAY EXAM CHEST 1 VIEW: CPT

## 2020-02-20 RX ADMIN — ALBUTEROL SULFATE SCH MG: 2.5 SOLUTION RESPIRATORY (INHALATION) at 09:46

## 2020-02-20 RX ADMIN — ALBUTEROL SULFATE SCH MG: 2.5 SOLUTION RESPIRATORY (INHALATION) at 09:45

## 2020-02-20 NOTE — DIAGNOSTIC IMAGING REPORT
Indication: Pain, trauma

 

Technique: 3 views right hand

 

Comparison: Mid and

 

Findings: There is a comminuted impacted posteriorly angulated fracture of the distal

radius. The ulna appears intact. No carpal or hand fracture demonstrated. The joint

spaces are preserved

 

Impression: Positive for distal radial fracture

 

Findings discussed by phone with Dr. Wheeler in the emergency room at the time of

interpretation

## 2020-02-20 NOTE — DIAGNOSTIC IMAGING REPORT
Indications: Pain, trauma

 

Technique: Two views of the  right  forearm

 

Comparison: None

 

Findings: There is a comminuted posteriorly angulated slightly impacted fracture of

the distal radius. No definite associated ulnar fracture. No radiopaque foreign body

 

Impression: Positive for distal radial fracture

 

Findings discussed by phone with Dr. Wheeler in the emergency room at the time of

interpretation

## 2020-02-20 NOTE — NUR
ED Nurse Note:



pt helped onto bedside cammode without complications. pt denies any pain or 
other symptoms at this time. daughter is at bedside

## 2020-02-20 NOTE — DIAGNOSTIC IMAGING REPORT
Indication: Shortness of breath

 

Technique: One view of the chest

 

Comparison: 10/29/2019

 

Findings: The heart is borderline enlarged. There is atelectasis at the left lung

base. There is borderline interstitial congestive change. No focal airspace

consolidation. There may be a small right pleural effusion

 

Impression: Borderline cardiomegaly

 

Mild interstitial congestion

 

Possible small right pleural effusion

## 2020-02-20 NOTE — NUR
ED Nurse Note:



pt received from RADHA Saldivar.



pt is in bed talking on the phone, VSS, she does not appear to be in any 
distress at this time, safety precautions are in place, will continue to  
monitor.

## 2020-02-20 NOTE — CONSULTATION
DATE OF CONSULTATION:  02/20/2020

HISTORY OF PRESENT ILLNESS:  The patient is a pleasant 70-year-old female

who had a fall on Saturday, sustaining injury to the right hand and wrist.

She noted over the last few days it is swollen and painful and she was

having trouble moving her fingers back and forth.  She also is having some

exacerbation of underlying asthma issues and reports to [facility] today for shortness of  breath.  While at [facility], she mentioned a

fall and swelling in her right wrist and got x-rays done, noting a badly

comminuted distal radius fracture.  Splint was applied and orthopedic

consult was obtained for surgical intervention.



PAST MEDICAL HISTORY:  Asthma.



PAST SURGICAL HISTORY:  None.



CURRENT MEDICATIONS:  Please see chart.



ALLERGIES:  None.



SOCIAL HISTORY:  She lives at home.  She has a daughter and a grandson and

is independent with all activities.



PHYSICAL EXAMINATION:

GENERAL:  She is very pleasant on examination.  She is alert and oriented.

She is in a well-padded splint on the right upper extremity.  She is able

to wiggle her fingers, although she has a lot of swelling in the hand.

She is able to make a fist.  She is neurovascularly intact.



LABORATORY AND DIAGNOSTIC DATA:  X-ray of the wrist reviewed with badly

comminuted and displaced distal radius fracture.



IMPRESSION:  Right wrist badly comminuted distal radius fracture.



DISCUSSION:  At this time, I discussed with the patient and her family at

the bedside my findings.  I recommend at this point going forward with

surgical intervention including right wrist open reduction, internal

fixation with volar plates and screws.  They would like to get that done.

She is a right-handed woman and having a hard time doing her activities at

this point and she understands that if we leave the fracture alone and

allow it to heal in the position that it is in, she will have very poor

result with long-term complications.  She opted therefore to have surgery.

We will ensure that she has appropriate preoperative medical clearance

given her comorbid conditions and we will prepare her for surgery tomorrow

afternoon.  Risks of surgery including nerve injury, vessel injury,

infection, bleeding were discussed with her.  Risk of additional

fractures, hardware failures, additional bone injury, painful hardware

down the line and possible need for revision surgery was also discussed

with her.  She understands what this involved and the complications

associated with this.  She also understands risk of anesthesia and risk of

morbidity with surgery.  We will ensure she has appropriate medical

clearance, order 2D echo and have her cleared by primary team and will

proceed with surgery as planned tomorrow.  This was all discussed with the

patient and family at bedside who agrees with this plan.







  ______________________________________________

  Macario Bond M.D.





  ______________________________________________

  SELENE Whelan





DR:  Sean

D:  02/20/2020 16:58

T:  02/20/2020 21:16

JOB#:  [id number]

CC:

## 2020-02-20 NOTE — EMERGENCY ROOM REPORT
History of Present Illness


General


Chief Complaint:  Dyspnea/Respdistress


Source:  Patient, Significant Other





Present Illness


HPI


Patient presents reporting that she is having a ' flareup of my asthma'


Denies any chest pain she reports that walking back to her room


Or any short exertion causes increased shortness of breath





Patient denies any vomiting or diarrhea she has had a mild cough denies any 

recent travel denies any pleurisy


Denies any fevers


Denies any change in medications recently





Later on the history patient also reports that she had a fall mechanical 

injuring her right hand and forearm


Allergies:  


Coded Allergies:  


     No Known Allergies (Unverified , 10/29/19)





Patient History


Past Medical History:  see triage record


Reviewed Nursing Documentation:  PMH: Agreed; PSxH: Agreed





Nursing Documentation-PMH


Hx Asthma:  Yes


Hx COPD:  Yes





Review of Systems


All Other Systems:  negative except mentioned in HPI





Physical Exam





Vital Signs








  Date Time  Temp Pulse Resp B/P (MAP) Pulse Ox O2 Delivery O2 Flow Rate FiO2


 


2/20/20 08:35 98.4 95 22 131/73 (92) 98 Room Air  


 


2/20/20 09:09        99








Sp02 EP Interpretation:  reviewed, normal


General Appearance:  other - Morbidly obese


Head:  normocephalic, atraumatic


Eyes:  bilateral eye PERRL, bilateral eye EOMI


ENT:  hearing grossly normal, EOM grossly intact


Neck:  supple


Respiratory:  no respiratory distress, no retraction, no accessory muscle use, 

wheezing - Mild wheezing bilaterally


Cardiovascular #1:  regular rate, rhythm


Musculoskeletal:  swelling - And ecchymosis to the right hand and forearm


Neurologic:  alert, CNs III-XII nml as tested


Skin:  other - As above


Lymphatic:  normal inspection





Procedures


Splinting


Splinting :  


   Consent:  Verbal


   Location:  right forearm


   Pre-Made Type:  velcro


   Splint:  volar


   Pre-Proc Neuro Vasc Exam:  normal


   Post-Proc Neuro Vasc Exam:  normal


   Patient Tolerated:  Well


   Complications:  None





Medical Decision Making


Diagnostic Impression:  


 Primary Impression:  


 Respiratory distress


 Additional Impressions:  


 COPD (chronic obstructive pulmonary disease)


 Distal radial fracture


ER Course


Patient is a fairly complex patient with multiple differential to consideration 

including but not limited to cardiac cardiopulmonary and vascular emergencies





Patient also complaining of right distal forearm pain x-ray imaging does show 

acute fracture





Patient's breathing has improved however still shows signs of wheezing








Patient received multiple treatments orthopedics is consulted and patient 

admitted for further inpatient care





Labs








Test


  2/20/20


09:40


 


White Blood Count


  10.1 K/UL


(4.8-10.8)


 


Red Blood Count


  4.53 M/UL


(4.20-5.40)


 


Hemoglobin


  12.1 G/DL


(12.0-16.0)


 


Hematocrit


  38.1 %


(37.0-47.0)


 


Mean Corpuscular Volume 84 FL (80-99) 


 


Mean Corpuscular Hemoglobin


  26.6 PG


(27.0-31.0)


 


Mean Corpuscular Hemoglobin


Concent 31.6 G/DL


(32.0-36.0)


 


Red Cell Distribution Width


  14.3 %


(11.6-14.8)


 


Platelet Count


  353 K/UL


(150-450)


 


Mean Platelet Volume


  5.6 FL


(6.5-10.1)


 


Neutrophils (%) (Auto)


  70.5 %


(45.0-75.0)


 


Lymphocytes (%) (Auto)


  19.4 %


(20.0-45.0)


 


Monocytes (%) (Auto)


  5.9 %


(1.0-10.0)


 


Eosinophils (%) (Auto)


  3.3 %


(0.0-3.0)


 


Basophils (%) (Auto)


  0.9 %


(0.0-2.0)


 


Sodium Level


  141 MMOL/L


(136-145)


 


Potassium Level


  4.7 MMOL/L


(3.5-5.1)


 


Chloride Level


  105 MMOL/L


()


 


Carbon Dioxide Level


  28 MMOL/L


(21-32)


 


Anion Gap


  8 mmol/L


(5-15)


 


Blood Urea Nitrogen


  17 mg/dL


(7-18)


 


Creatinine


  1.3 MG/DL


(0.55-1.30)


 


Estimat Glomerular Filtration


Rate 40.5 mL/min


(>60)


 


Glucose Level


  114 MG/DL


()


 


Calcium Level


  9.1 MG/DL


(8.5-10.1)


 


Total Bilirubin


  0.2 MG/DL


(0.2-1.0)


 


Aspartate Amino Transf


(AST/SGOT) 16 U/L (15-37) 


 


 


Alanine Aminotransferase


(ALT/SGPT) 17 U/L (12-78) 


 


 


Alkaline Phosphatase


  83 U/L


()


 


Troponin I


  0.000 ng/mL


(0.000-0.056)


 


Pro-B-Type Natriuretic Peptide


  251 pg/mL


(0-125)


 


Total Protein


  7.6 G/DL


(6.4-8.2)


 


Albumin


  3.1 G/DL


(3.4-5.0)


 


Globulin 4.5 g/dL 


 


Albumin/Globulin Ratio 0.7 (1.0-2.7) 








Rhythm Strip Diag. Results


EP Interpretation:  yes


Rate:  88


Rhythm:  NSR, no PVC's, no ectopy





Chest X-Ray Diagnostic Results


Chest X-Ray Diagnostic Results :  


   Chest X-Ray Ordered:  Yes


   # of Views/Limited/Complete:  1 View


   Indication:  Chest Pain


   EP Interpretation:  Yes


   Interpretation:  no consolidation, no effusion, other - Pulmonary congestion


   Impression:  Other - Pulmonary congestion


   Electronically Signed by:  Isabella Wheeler DO





Other X-Ray Diagnostic Results


Other X-Ray Diagnostic Results #1:  


   X-Ray ordered:  right hand


   # of Views/Limited Vs Complete:  3 View


   Indication:  Pain


   EP Interpretation:  Yes


   Interpretation:  no soft tissue swelling, other - Distal radial fracture 

displacement


   Impression:  Other - Distal radial fracture displaced


   Electronically Signed by:  Isabella Wheeler DO


Other X-Ray Diagnostic Results #2:  


   X-Ray ordered:  right forearm


   # of Views/Limited Vs Complete:  2 View


   Indication:  Pain


   EP Interpretation:  Yes


   Interpretation:  no soft tissue swelling, other - Distal radial fracture, 

displacement


   Impression:  Other - Distal radial fracture displacement


   Electronically Signed by:  Isabella Wheeler DO





Last Vital Signs








  Date Time  Temp Pulse Resp B/P (MAP) Pulse Ox O2 Delivery O2 Flow Rate FiO2


 


2/20/20 09:09 98.4 87 22 137/78 99 Room Air  


 


2/20/20 09:09        99








Status:  improved


Disposition:  ADMITTED AS INPATIENT


Condition:  Serious











Isabella Wheeler DO Feb 20, 2020 09:37

## 2020-02-20 NOTE — NUR
TRANSFER TO FLOOR:

Patient transferred to  as ordered, per ERMD.   Report given to RADHA Hernandez.  
Belongings sent with pt. daughter is at bedside and aware of transfer

## 2020-02-20 NOTE — CONSULTATION
Consult Note


Consult Note


70 female with fall on saturday and rt wrist pain





badly comminuted distal radius fracture





d/w pt and family and rec ORIF.





will plan on sx tomorrow 1pm.





needs med clearance











Estephania Cadena Feb 20, 2020 16:51

## 2020-02-20 NOTE — NUR
ED Nurse Note:



call made to Dr. Acevedo for inpatient orders. voicemail instructed to send 
Dr a text message, message sent regarding orders. will continue to wait for 
inpatient orders.

## 2020-02-20 NOTE — NUR
ED Nurse Note:

Pt walked into ED w/ c/o SOB for 1 day. Pt states asthma machine broken at 
home. Current O2 is 95% RA. Pt lung sounds clear to auscultation bilaterally. 
Pt has hx of asthma and COPD. Pt states she fell at home on saturday and 
injured R wrist. R wrist pain 9/10 and ROM 1/5. Pt denies numbness/pain. Pt is 
alert and orientedx4, uses W/C. Pt set up on monitor.

## 2020-02-21 VITALS — DIASTOLIC BLOOD PRESSURE: 86 MMHG | SYSTOLIC BLOOD PRESSURE: 187 MMHG

## 2020-02-21 VITALS — SYSTOLIC BLOOD PRESSURE: 136 MMHG | DIASTOLIC BLOOD PRESSURE: 82 MMHG

## 2020-02-21 VITALS — DIASTOLIC BLOOD PRESSURE: 104 MMHG | SYSTOLIC BLOOD PRESSURE: 196 MMHG

## 2020-02-21 VITALS — DIASTOLIC BLOOD PRESSURE: 65 MMHG | SYSTOLIC BLOOD PRESSURE: 140 MMHG

## 2020-02-21 VITALS — DIASTOLIC BLOOD PRESSURE: 94 MMHG | SYSTOLIC BLOOD PRESSURE: 178 MMHG

## 2020-02-21 VITALS — SYSTOLIC BLOOD PRESSURE: 160 MMHG | DIASTOLIC BLOOD PRESSURE: 75 MMHG

## 2020-02-21 VITALS — SYSTOLIC BLOOD PRESSURE: 179 MMHG | DIASTOLIC BLOOD PRESSURE: 91 MMHG

## 2020-02-21 VITALS — DIASTOLIC BLOOD PRESSURE: 80 MMHG | SYSTOLIC BLOOD PRESSURE: 180 MMHG

## 2020-02-21 VITALS — SYSTOLIC BLOOD PRESSURE: 178 MMHG | DIASTOLIC BLOOD PRESSURE: 81 MMHG

## 2020-02-21 VITALS — SYSTOLIC BLOOD PRESSURE: 143 MMHG | DIASTOLIC BLOOD PRESSURE: 65 MMHG

## 2020-02-21 LAB
ADD MANUAL DIFF: YES
ANION GAP SERPL CALC-SCNC: 12 MMOL/L (ref 5–15)
BUN SERPL-MCNC: 18 MG/DL (ref 7–18)
CALCIUM SERPL-MCNC: 8.5 MG/DL (ref 8.5–10.1)
CHLORIDE SERPL-SCNC: 111 MMOL/L (ref 98–107)
CO2 SERPL-SCNC: 23 MMOL/L (ref 21–32)
CREAT SERPL-MCNC: 0.9 MG/DL (ref 0.55–1.3)
ERYTHROCYTE [DISTWIDTH] IN BLOOD BY AUTOMATED COUNT: 14.4 % (ref 11.6–14.8)
HCT VFR BLD CALC: 37 % (ref 37–47)
HGB BLD-MCNC: 11.8 G/DL (ref 12–16)
MCV RBC AUTO: 84 FL (ref 80–99)
PLATELET # BLD: 255 K/UL (ref 150–450)
POTASSIUM SERPL-SCNC: 4 MMOL/L (ref 3.5–5.1)
RBC # BLD AUTO: 4.43 M/UL (ref 4.2–5.4)
SODIUM SERPL-SCNC: 146 MMOL/L (ref 136–145)
WBC # BLD AUTO: 8.6 K/UL (ref 4.8–10.8)

## 2020-02-21 PROCEDURE — 0PSH04Z REPOSITION RIGHT RADIUS WITH INTERNAL FIXATION DEVICE, OPEN APPROACH: ICD-10-PCS

## 2020-02-21 RX ADMIN — SODIUM CHLORIDE PRN MG: 9 INJECTION, SOLUTION INTRAVENOUS at 15:29

## 2020-02-21 RX ADMIN — HYDRALAZINE HYDROCHLORIDE PRN MG: 25 TABLET ORAL at 23:51

## 2020-02-21 RX ADMIN — OXYCODONE HYDROCHLORIDE SCH MG: 20 TABLET, FILM COATED, EXTENDED RELEASE ORAL at 21:47

## 2020-02-21 RX ADMIN — SODIUM CHLORIDE SCH MLS/HR: 0.9 INJECTION INTRAVENOUS at 17:44

## 2020-02-21 RX ADMIN — SODIUM CHLORIDE PRN MG: 9 INJECTION, SOLUTION INTRAVENOUS at 15:09

## 2020-02-21 RX ADMIN — DOCUSATE SODIUM SCH MG: 100 CAPSULE, LIQUID FILLED ORAL at 17:44

## 2020-02-21 NOTE — DIAGNOSTIC IMAGING REPORT
Indication: Postoperative, history of right wrist fracture

 

Technique: 2 views of the right wrist

 

Comparison: 2/20/2020

 

Findings: Patient is status post surgical repair of previously demonstrated distal

radial fracture with a plate and screws. There is markedly improved anatomic

alignment

 

Impression: Intraoperative imaging, as described

## 2020-02-21 NOTE — IMMEDIATE POST-OP EVALUATION
Immediate Post-Op Evalulation


Immediate Post-Op Evalulation


Procedure:  ORIF R Wrist


Date of Evaluation:  2020


Time of Evaluation:  15:00


IV Fluids:  200 LR


Blood Products:  0


Estimated Blood Loss:  10


Urinary Output:  0


Blood Pressure Systolic:  196


Blood Pressure Diastolic:  104


Pulse Rate:  81


Respiratory Rate:  16


O2 Sat by Pulse Oximetry:  100


Temperature (Fahrenheit):  98.4


Pain Score (1-10):  3


Nausea:  No


Vomiting:  No


Complications


0


Patient Status:  awake, reacts, patent, extubated, none


Hydration Status:  adequate


Dru Grams Ancef IV


Given Within 1 Hr of Incision:  Yes


Time Given:  13:16











Dequan Colin MD 2020 13:38

## 2020-02-21 NOTE — CARDIOLOGY PROGRESS NOTE
Assessment/Plan


Assessment/Plan


The patient is seen and examined, full consult note will be dictated shortly.





Objective





Last 24 Hour Vital Signs








  Date Time  Temp Pulse Resp B/P (MAP) Pulse Ox O2 Delivery O2 Flow Rate FiO2


 


2/21/20 15:30  84 16 178/81 100 Nasal Cannula 3 


 


2/21/20 15:24 97.6       


 


2/21/20 15:21    192/84    


 


2/21/20 15:19  81 16 180/80 100 Nasal Cannula 3 


 


2/21/20 15:09  80 15 187/86 100 Nasal Cannula 3 


 


2/21/20 14:59  82 16 179/91 100 Simple Mask 6 


 


2/21/20 14:54  81 15 178/94 100 Simple Mask 6 


 


2/21/20 14:49 98.4 86 16 196/104 100 Simple Mask 6 


 


2/21/20 14:46  81 16  100   


 


2/21/20 12:00  70      


 


2/21/20 09:00      Nasal Cannula 2.0 


 


2/21/20 08:00  74      


 


2/21/20 08:00 98.1 72 18 160/75 (103) 100   


 


2/21/20 08:00       2.0 


 


2/21/20 04:00       2.0 


 


2/21/20 04:00  88      


 


2/21/20 03:23      Nasal Cannula 2.0 


 


2/21/20 00:00  84      


 


2/20/20 23:35 98.4 89 21 130/87 99 Room Air  21


 


2/20/20 22:20  89 21 130/87 99 Room Air  21


 


2/20/20 20:27  85 19 121/95 99 Room Air  


 


2/20/20 17:08 98.4 81 19 118/67 98 Room Air  

















Intake and Output  


 


 2/20/20 2/21/20





 19:00 07:00


 


Intake Total 0 ml 


 


Balance 0 ml 


 


  


 


Intake Oral 0 ml 


 


# Voids  1











Laboratory Tests








Test


  2/21/20


10:10


 


White Blood Count


  8.6 K/UL


(4.8-10.8)


 


Red Blood Count


  4.43 M/UL


(4.20-5.40)


 


Hemoglobin


  11.8 G/DL


(12.0-16.0)  L


 


Hematocrit


  37.0 %


(37.0-47.0)


 


Mean Corpuscular Volume 84 FL (80-99)  


 


Mean Corpuscular Hemoglobin


  26.6 PG


(27.0-31.0)  L


 


Mean Corpuscular Hemoglobin


Concent 31.8 G/DL


(32.0-36.0)  L


 


Red Cell Distribution Width


  14.4 %


(11.6-14.8)


 


Platelet Count


  255 K/UL


(150-450)


 


Mean Platelet Volume


  5.1 FL


(6.5-10.1)  L


 


Neutrophils (%) (Auto)


  % (45.0-75.0)


 


 


Lymphocytes (%) (Auto)


  % (20.0-45.0)


 


 


Monocytes (%) (Auto)  % (1.0-10.0)  


 


Eosinophils (%) (Auto)  % (0.0-3.0)  


 


Basophils (%) (Auto)  % (0.0-2.0)  


 


Differential Total Cells


Counted 100  


 


 


Neutrophils % (Manual) 69 % (45-75)  


 


Lymphocytes % (Manual) 24 % (20-45)  


 


Monocytes % (Manual) 6 % (1-10)  


 


Eosinophils % (Manual) 1 % (0-3)  


 


Basophils % (Manual) 0 % (0-2)  


 


Band Neutrophils 0 % (0-8)  


 


Platelet Estimate Adequate  


 


Platelet Morphology Normal  


 


Hypochromasia 1+  


 


Anisocytosis 1+  


 


Sodium Level


  146 MMOL/L


(136-145)  H


 


Potassium Level


  4.0 MMOL/L


(3.5-5.1)


 


Chloride Level


  111 MMOL/L


()  H


 


Carbon Dioxide Level


  23 MMOL/L


(21-32)


 


Anion Gap


  12 mmol/L


(5-15)


 


Blood Urea Nitrogen


  18 mg/dL


(7-18)


 


Creatinine


  0.9 MG/DL


(0.55-1.30)


 


Estimat Glomerular Filtration


Rate > 60 mL/min


(>60)


 


Glucose Level


  101 MG/DL


()


 


Calcium Level


  8.5 MG/DL


(8.5-10.1)

















Ajay Silva MD Feb 21, 2020 15:41

## 2020-02-21 NOTE — BRIEF OPERATIVE NOTE
Immediate Post Operative Note


Operative Note


Chief Complaint:  rt wrist fracture


Pre-op Diagnosis:


rt wrist fracture


Procedure:


rt wrist ORIF


Post-op Diagnosis:  same as pre-op


Findings:  consistent w/pre-op dx studies


Surgeon:  md robyn


Assistant:  rosario diggs


Anesthesiologist:  md nati


Anesthesia:  general


Specimen:  none


Complications:  none


Condition:  stable


Fluids:  ns


Estimated Blood Loss:  minimal


Drains:  none


Implant(s) used?:  Yes - Estephania Leblanc Feb 21, 2020 14:32

## 2020-02-21 NOTE — CONSULTATION
DATE OF CONSULTATION:  02/21/2020

PULMONARY CONSULTATION



CONSULTING PHYSICIAN:  Bill Montana M.D.



REFERRING PHYSICIAN:  Isabella Nolasco M.D.



HISTORY OF PRESENT ILLNESS:  This is a 70-year-old female, who presented to

the hospital with shortness of breath.  The patient also reported a

mechanical fall and fracture of her right forearm.  She was seen and

evaluated by Orthopedics and currently has undergone surgery as well.  The

patient reports that she has a long-standing history of bronchial asthma.

Currently, she states she is feeling better.



MEDICATIONS:  Her list of medications include aspirin, Ancef, Celebrex,

Colace, DuoNeb, hydralazine, Norco, and Protonix.



ALLERGIES:  None reported.



CODE STATUS:  Full.



REVIEW OF SYSTEMS:  Denies any headaches, hematemesis, melena,

hematochezia, or weight loss.



PHYSICAL EXAMINATION:

GENERAL:  Reveals a 70-year-old female.

VITAL SIGNS:  Blood pressure is 140/60, heart rate 84, respirations 18, O2

saturation 99% on 2 L of oxygen.

HEENT:  Unremarkable.

LUNGS:  Clear breath sounds bilaterally.

ABDOMEN:  Soft.

NEUROLOGIC:  Nonfocal.



IMAGING STUDIES:  Wrist x-ray shows distal radius fracture.  X-ray of chest

obtained yesterday shows small right effusion and borderline

cardiomegaly.



IMPRESSION:

1. Bronchial asthma, stable.

2. Status post ORIF, right wrist fracture.

3. _____.

4. Diabetes mellitus.



DISCUSSION:  Admit to the hospital.  Continue current medications and care.

We will follow as pulmonologist.  We will order oxygen and pulmonary

hygiene.  Follow up the steroids.  Lab data is reviewed.  We will follow

carefully.









  ______________________________________________

  Bill Montana M.D. DR:  DONNA

D:  02/21/2020 18:00

T:  02/21/2020 18:23

JOB#:  [id number]

CC:

## 2020-02-21 NOTE — DIAGNOSTIC IMAGING REPORT
INDICATION:  Pain, intraoperative

 

TECHNIQUE: Intraoperative imaging

 

Fluoroscopy time: 14.2 seconds

Total dose: 0.26636 mGym2

Total number of images: 4

 

COMPARISON: 2/20/2020

 

FINDINGS: Intraoperative images document surgical repair of previously demonstrated

distal radial fracture with plate and screws

 

IMPRESSION: Intraoperative imaging, as described

## 2020-02-21 NOTE — NUR
CASE MANAGEMENT: INITIAL REVIEW 



2/20/2020



69 YO F PRESENTED TO ED FROM HOME 



CC: SOB 



PMHx:  COPD. ASTHMA. 



SI:DYSPNEA. RADIAL FRACTURE. 

T 98.4 HR 95 RR 22 B/P 131/73 SATS 98% ON RA 

LABS:   ALBUMIN 3.1 



IS: ALBUTEROL HHN X1 

PREDNISONE PO X1

CXR Impression: Borderline cardiomegaly. Mild interstitial congestion. Possible small 
right pleural effusion

R HAND XRAY Impression: Positive for distal radial fracture

R XRAY FOREARM Impression: Positive for distal radial fracture



**PATIENT ADMITTED TO TELE 2/20/2020 @ 1207***



DCP: PATIENT TO BE DISCHARGED TO HOME ONCE MEDICALLY CLEARED. 



PLAN OF CARE: 

plan on sx tomorrow 1pm

needs med clearance



2/21/2020



SI:DYSPNEA. RADIAL FRACTURE. 

T 98.4 HR 89 RR 21 B/P 130/87 SATS 99% ON RA 

LABS: NONE TODAY 



IS: NS @ 80 mL/HR 



**TELE***



DCP: PATIENT TO BE DISCHARGED TO HOME ONCE MEDICALLY CLEARED. 



PLAN OF CARE: 

SURGERY TODAY 

-------------------------------------------------------------------------------

Addendum: 02/21/20 at 1025 by Tiana Vincent 

-------------------------------------------------------------------------------

INTERQUAL MET

## 2020-02-21 NOTE — PRE-PROCEDURE NOTE/ATTESTATION
Pre-Procedure Note/Attestation


Complete Prior to Procedure


Planned Procedure:  right


Procedure Narrative:


rt wrist ORIF





Indications for Procedure


Pre-Operative Diagnosis:


rt wrist fracture





Attestation


I attest that I discussed the nature of the procedure; its benefits; risks and 

complications; and alternatives (and the risks and benefits of such alternatives

), prior to the procedure, with the patient (or the patient's legal 

representative).





I attest that, if there was a reasonable possibility of needing a blood 

transfusion, the patient (or the patient's legal representative) was given the 

[facility] standardized written summary, pursuant 

to the [employer] [place] Blood Safety Act ([place] Health and Safety Code # 1645, as 

amended).





I attest that I re-evaluated the patient just prior to the surgery and that 

there has been no change in the patient's H&P, except as documented below:NONE











Macario Bond MD Feb 21, 2020 13:00

## 2020-02-21 NOTE — NUR
NURSE NOTES:

Patient transferred from Surgery to Tele. Received report from RADHA Batista. Patient in bed 
sleeping, drowsy. SR, VS checked. No bleeding from surgery site, no c/o pain at this time, 
family member at the bedside. Will continue to monitor.

## 2020-02-21 NOTE — NUR
NURSE NOTES:

Received report from Bonnie MORA RN. Pt is in stable condition, will continue plan of care and 
close monitoring.

## 2020-02-21 NOTE — NUR
NURSE NOTES:

Received report from CARROL Shannon RN. Pt in stable condition, denies pain, a/ox4, daughter at 
bedside. VS WNL. Will start plan of care and close monitoring.

## 2020-02-21 NOTE — NUR
NURSE NOTES:

Per Dr. Acevedo, regular diet, modification as tolerated. Order noted, entered, carried 
out.

## 2020-02-21 NOTE — NUR
*-* INSURANCE *-*



ALL CLINICALS AND REVIEWS HAVE BEEN FAXED TO:



[facility]

P: 

F: [phone]

## 2020-02-21 NOTE — NUR
NURSE NOTES:

Received report from RADHA Logna. Patient in bed resting, no active s/s cardiac, respiratory 
distress noticed at this time. Patient AOx4, SR with HR 88, on room air. Endorsed patient 
NPO due to ORIF schedule for today 2/21/20. Family member, Daughter, at the bedside. Bed in 
lowest position, side rails upx2, call light within reach. Will continue to monitor.

## 2020-02-21 NOTE — HISTORY & PHYSICAL
History of Present Illness


General


Reason for Hospitalization:  Dyspnea/Respdistress





Present Illness


Allergies:  


Coded Allergies:  


     No Known Allergies (Unverified , 10/29/19)





Medication History


Scheduled


Albuterol Sulfate (Ventolin Hfa), 2 PUFFS INH EVERY 6 HOURS, (Reported)


No Known Medications* (NKM - No Known Medications*), 0 ., (Reported)





Patient History


Healthcare decision maker





Resuscitation status


Full Code


Advanced Directive on File








Review of Systems


Review of Symptoms


General ROS: no weight loss or fever


Psychological ROS: no depression or mood changes, no memory loss


Ophthalmic ROS: no visual changes or eye irritation


ENT ROS: no nasal congestion, hearing loss, dizziness


Allergy and Immunology ROS: no allergic symptoms or urticaria


Hematological and Lymphatic ROS: no swollen glands, unusual bleeding or bruising


Endocrine ROS: no polyuria, polydipsia, weight changes, temperature intolerance


Respiratory ROS: no cough, shortness of breath, or wheezing


Cardiovascular ROS: no chest pain or dyspnea on exertion


Gastrointestinal ROS: denies abdominal pain, bright red blood in stool.


Musculoskeletal ROS: no myalgias or arthralgias


Neurological ROS: no TIA or stroke symptoms


Dermatological ROS: no new or changing skin lesions, rashes or pruritis





Physical Exam


Physical Exam


General appearance:  alert, cooperative, no distress, appears stated age


Head:  Normocephalic, without obvious abnormality, atraumatic


Eyes:  conjunctivae/corneas clear. PERRL, EOM's intact. Fundi benign


Throat:  Lips, mucosa, and tongue normal. Teeth and gums normal


Neck:  supple, symmetrical, trachea midline, no adenopathy, thyroid: not 

enlarged, symmetric, no tenderness/mass/nodules, no carotid bruit and no JVD


Lungs:  clear to auscultation bilaterally


Heart:  regular rate and rhythm, S1, S2 normal, no murmur, click, rub or gallop


Abdomen:  soft, non-tender. Bowel sounds normal. No masses,  no organomegaly


Extremities:  extremities normal, atraumatic, no cyanosis or edema


Pulses:  2+ and symmetric


Skin:  Skin color, texture, turgor normal. No rashes or lesions


Neurologic:  Grossly normal





Last 24 Hour Vital Signs








  Date Time  Temp Pulse Resp B/P (MAP) Pulse Ox O2 Delivery O2 Flow Rate FiO2


 


2/21/20 04:00       2.0 


 


2/21/20 04:00  88      


 


2/21/20 03:23      Nasal Cannula 2.0 


 


2/21/20 00:00  84      


 


2/20/20 23:35 98.4 89 21 130/87 99 Room Air  21


 


2/20/20 22:20  89 21 130/87 99 Room Air  21


 


2/20/20 20:27  85 19 121/95 99 Room Air  


 


2/20/20 17:08 98.4 81 19 118/67 98 Room Air  


 


2/20/20 14:25 98.4 88 20 113/70 97 Room Air  


 


2/20/20 11:35 98.4 84 21 115/71 100 Room Air  


 


2/20/20 09:48  82 26   Room Air  21


 


2/20/20 09:47  82 26  100 Room Air  21


 


2/20/20 09:09 98.4 87 22 137/78 99 Room Air  


 


2/20/20 09:09  87 22   Room Air  99


 


2/20/20 08:35 98.4 95 22 131/73 (92) 98 Room Air  

















Intake and Output  


 


 2/20/20 2/21/20





 19:00 07:00


 


Intake Total 0 ml 


 


Balance 0 ml 


 


  


 


Intake Oral 0 ml 


 


# Voids  1











Laboratory Tests








Test


  2/20/20


09:40


 


White Blood Count


  10.1 K/UL


(4.8-10.8)


 


Red Blood Count


  4.53 M/UL


(4.20-5.40)


 


Hemoglobin


  12.1 G/DL


(12.0-16.0)


 


Hematocrit


  38.1 %


(37.0-47.0)


 


Mean Corpuscular Volume 84 FL (80-99)  


 


Mean Corpuscular Hemoglobin


  26.6 PG


(27.0-31.0)  L


 


Mean Corpuscular Hemoglobin


Concent 31.6 G/DL


(32.0-36.0)  L


 


Red Cell Distribution Width


  14.3 %


(11.6-14.8)


 


Platelet Count


  353 K/UL


(150-450)


 


Mean Platelet Volume


  5.6 FL


(6.5-10.1)  L


 


Neutrophils (%) (Auto)


  70.5 %


(45.0-75.0)


 


Lymphocytes (%) (Auto)


  19.4 %


(20.0-45.0)  L


 


Monocytes (%) (Auto)


  5.9 %


(1.0-10.0)


 


Eosinophils (%) (Auto)


  3.3 %


(0.0-3.0)  H


 


Basophils (%) (Auto)


  0.9 %


(0.0-2.0)


 


Sodium Level


  141 MMOL/L


(136-145)


 


Potassium Level


  4.7 MMOL/L


(3.5-5.1)


 


Chloride Level


  105 MMOL/L


()


 


Carbon Dioxide Level


  28 MMOL/L


(21-32)


 


Anion Gap


  8 mmol/L


(5-15)


 


Blood Urea Nitrogen


  17 mg/dL


(7-18)


 


Creatinine


  1.3 MG/DL


(0.55-1.30)


 


Estimat Glomerular Filtration


Rate 40.5 mL/min


(>60)


 


Glucose Level


  114 MG/DL


()  H


 


Calcium Level


  9.1 MG/DL


(8.5-10.1)


 


Total Bilirubin


  0.2 MG/DL


(0.2-1.0)


 


Aspartate Amino Transf


(AST/SGOT) 16 U/L (15-37)


 


 


Alanine Aminotransferase


(ALT/SGPT) 17 U/L (12-78)


 


 


Alkaline Phosphatase


  83 U/L


()


 


Troponin I


  0.000 ng/mL


(0.000-0.056)


 


Pro-B-Type Natriuretic Peptide


  251 pg/mL


(0-125)  H


 


Total Protein


  7.6 G/DL


(6.4-8.2)


 


Albumin


  3.1 G/DL


(3.4-5.0)  L


 


Globulin 4.5 g/dL  


 


Albumin/Globulin Ratio


  0.7 (1.0-2.7)


L








Height (Feet):  5


Height (Inches):  3.00


Weight (Pounds):  264


Medications





Current Medications








 Medications


  (Trade)  Dose


 Ordered  Sig/Brenda


 Route


 PRN Reason  Start Time


 Stop Time Status Last Admin


Dose Admin


 


 Albuterol/


 Ipratropium


  (Albuterol/


 Ipratropium)  3 ml  Q2HRT  PRN


 HHN


 Bronchospasm  2/21/20 03:15


 2/26/20 03:14   


 


 


 Ipratropium


 Bromide


  (Atrovent)  500 mcg  Q4H  PRN


 HHN


 Shortness of Breath  2/21/20 03:15


 2/26/20 03:14 UNV  


 


 


 Sodium Chloride  1,000 ml @ 


 80 mls/hr  [id number]


 IV


   2/21/20 03:15


 3/22/20 03:14  2/21/20 03:15


 











Assessment/Plan


Assessment/Plan:


H&P Internal Medicine





Covering for Dr. Isabella Golden


RFA: Right wrist fracture


DOS: 2/21/20





ID


70y old female Patient presents reporting that she is having a ' flareup of my 

asthma'


Denies any chest pain she reports that walking back to her room


Or any short exertion causes increased shortness of breath


Patient denies any vomiting or diarrhea she has had a mild cough denies any 

recent travel denies any pleurisy


Denies any fevers


Denies any change in medications recently


Later on the history patient also reports that she had a fall mechanical 

injuring her right hand and forearm





I discussed the case with Dr. Macario MARTINEZ and he will be doing surgery today at 1pm, 

approx, the family, david want a further explanantion of surgery and also of 

anethesia, they want to know if any "holistic" approaches to anesthesia, I said 

unlikely but to dw Anesthesia.





Allergies:  


Coded Allergies:  


     No Known Allergies (Unverified , 10/29/19)





Patient History


Past Medical History:  see triage record


Reviewed Nursing Documentation:  PMH: Agreed; PSxH: Agreed





Nursing Documentation-PMH


Hx Asthma:  Yes


Hx COPD:  Yes


ROS (review of systems):   


Constitutional: No fever, no chills, no night sweats, no fatigue   


Skin: No rashes, lumps, itchiness, dryness   


HEENT: No HA, ear ache, visual changes, double vision, nosebleeds   


Breasts: No lumps, pain, discharge   


Pulmonary: No cough, sputum, shortness of breath, coughing up blood   


Cardiovascular: No chest pain, tightness, palpitations, syncope, PND   


GI: No nausea, vomiting, diarrhea, melena, hematochezia, change in appetite,   


: No dysuria, frequency, urgency, urinary incontinence, foamy urine   


Musculoskeletal: No joint swelling or muscle pain, trauma, back pain   


Neurologic: No dizziness, fainting, seizures, changes in smell or taste   


Psychiatric: No nervousness, stress, or depression, anxiety, hallucinations   


Endocrine: No weight change, heat or cold intolerance, tremor, insomnia   


   


Physical Exam:   


Vitals: reviewed   


General: NAD   


HEENT: nc, at   


Neck: supple   


Chest: clear breath sounds bilaterally   


Cardiovascular: RRR, no s3, s4   


Abdomen: soft, nontender, nd   


Extremities: no cce, normal range of motion ++ ecchymoses right wrist


Neuro: alert and oriented





Labs: reviewed





Imaging: noted





Assesment and Recs:


# Right wrist fracture that requires surgery with ortho


--> pending potentially as early as today r wrist orif


--> asthma control


--> cardiac clearance


# COPD exacerbation history


--> as per Dr. Montana


# Electrolyte abnormality


--> per Dr. Celeste


# Asthma


--> breathing rx and steriods as per Dr. Macario MARTINEZ


--> requires better control pror to surgery


# Hypertension


--> dw Cards


# Morbid obesity


--> recommend weight loss


# Hyperglycemia , likely secondary to steroid


--> if worse endo





Appreciate consultant care and dw rn





MIPS


Hospital declaration


  INPATIENT level of care is warranted for this patient because patient is a 89 

year old with *** who presents with suspicion of ***. I have a high level of 

concern because ***. Patient is at high risk for ***. Plan of care/treatment 

include ***. Patient care is expected to be greater than 2 midnights.  





  OBSERVATION level of care is warranted for this patient. Patient is a 89 year 

old with *** who presents with ***. Patient will be admitted for 1 midnight, 

but if additional night(s) is/are necessary, patient will be converted to 

inpatient status for the entire hospitalization





Disposition: Once the patient is stable to leave the hospital, I anticipate the 

patient will likely be discharged to the following environment:***





Estimated discharge date: ***





I spent 70 minutes on this patient's case, and *** minutes was dedicated to 

counseling and/or care coordination. 








MIPS ([employer]-based Incentive Payment System) 


Applicable CPT: 92031, 03921





CHECK ALL THAT ARE MET:





  Measure #5 (CHF): All ages. Prescribe ACE/ARB upon discharge for patients 

with left ventricular systolic dysfunction. If not, the reason is clearly 

documented in the medical chart.





  Measure #8 (CHF): All ages. Prescribe a beta blocker upon discharge for 

patients with left ventricular systolic dysfunction. If not, the reason is 

clearly documented in the medical chart.





  Measure #47 Advance care plan or surrogate decision maker documented in the 

medical record. 





  Measure #130 The provider has documented, updated, or reviewed the patients 

current medication list and has documented it in the patients note.  





  Measure #374 (All): Send report to referring provider. 





  Measure #407(Sepsis due to MSSA bacteremia): Age 18+ Patient treated with a 

beta-lactam antibiotic (Nafcillin, Oxacillin or Cefazolin) as definitive 

therapy. 








MEDICAL COMPLEXITY


High complexity medical decision making (need 2/3 categories)





Problem - need 4 points


  Acute/new problem with new plan for workup (4 points, 1 max)


  Acute/new problem without additional workup (3 points, 1 max)


  Unstable chronic problem actively being managed (2 point each, 2 max)


  Stable chronic problem actively being managed (1 point each, 2 max)


  Self-limited/transient process (constipation, muscle ache, etc) (1 point each

, 2 max)


 





Data - need 4 points


  Reviewed labs/imaging studies (1 points, 2 max)


  Independent review of imaging (EKG, xrays, etc) (2 points, 2 max)


  Discussed case with consult/other MD/RN (2 points, 2 max)





High Risk - qualify if have one of the following:


  Severe exacerbation of acute problem, acute mental status change, IV narcotics

, monitoring drug levels (vancomycin, INR, tacrolimus etc)











Osmin Acevedo MD Feb 21, 2020 08:34

## 2020-02-21 NOTE — OPERATIVE NOTE - DICTATED
DATE OF OPERATION:  02/21/2020

PREOPERATIVE DIAGNOSIS:  Right distal radius fracture with comminution with

displacement.



POSTOPERATIVE DIAGNOSIS:  Right distal radius fracture with comminution

with displacement.



PROCEDURE:  Right wrist open reduction internal fixation using a [place]

volar plate with 3 distal screws, 2 of them locking, 1 of them bicortical

and 3 proximal screws, 2 of them locking, 1 bicortical.



SURGEON:  Macario Bond M.D.



ASSISTANT:  Estephania Cadena PA-C.



ANESTHESIOLOGIST:  Dequan Colin M.D.



ANESTHESIA:  General endotracheal anesthesia.



ESTIMATED BLOOD LOSS:  Less than 50 mL.



TOURNIQUET TIME:  45 minutes.



COMPLICATIONS:  None.



BRIEF HISTORY:  The patient is a very pleasant 70-year-old female who

sustained a fall and had a distal radius fracture.  She was evaluated and

was noted to have a displaced distal radius fracture.  After full

discussion of risks, benefits of the surgery and complications associated

with it including infection, bleeding, neurovascular complication,

possibility of malunion, possibility of nonunion, possibility of need for

further surgery, and other complications that may arise, she opted for

surgical treatment as described above.



OPERATIVE PROCEDURE:  The patient was brought to the operating room and was

placed supine.  All pressure points were well padded.  General LMA

anesthesia was induced and the right arm was prepped and draped in usual

sterile fashion.  The right arm was exsanguinated.  Tourniquet was

inflated to 275 mmHg.  A standard volar incision was undertaken.  The FCR

was identified and was retracted radially.  The internervous plane was

identified and retractors were placed in.  The pronator quadratus was

identified and this was released.  The fracture was identified.  There was

significant shortening, displacement, and loss of radial inclination.  At

this point, using manipulation, the fracture reduced anatomically.  The

plate was applied and was moved around to the area that had the best bone.

This was slightly more radial.  Therefore, the plate was positioned

slightly radially and had to be rotated so that the proximal screw can

catch the shaft of the radius.  At this point, the pins were placed and

the position of the plate was checked and reduction was checked.  The

reduction was anatomical and the plate was in satisfactory position.

Therefore, a bicortical central screw was placed in and plate was

compressed against the bone on the distal fragment.  At this point, 2

locking screws, 1 was placed in the styloid and 1 was placed more ulnarly

and this stabilized the plate very well.  At this point, using the plate,

the fracture was reduced and a volar inclination was recreated and clamps

were applied.  At this point, 1 bicortical screw was placed centrally on

the oblique hole.  Subsequently, 2 bicortical locking screws were placed

in and this provided excellent stability of the fracture.  At this point,

the position of fracture was checked on AP and lateral and appeared to be

perfect.  The volar tilt was created.  Inclination and length were

recreated.  The wounds were thoroughly irrigated using copious amount of

fluid.  The screw lengths were excellent.  There was 1 in and out screw,

which was proximal, which was long and with a 60 mm screw, that was

switched to a 40 mm screw.  At this point, all wounds were thoroughly

irrigated using copious amount of fluid.  The wound was injected with 0.5%

Marcaine with epinephrine.  The subcutaneous tissue was closed using 2-0

Vicryl suture.  Skin was closed using 3-0 Monocryl suture.  A sugar-tong

splint was applied.  All lap counts and instrument counts were correct.









  ______________________________________________

  Macario Bond M.D.





DR:  MARCO

D:  02/21/2020 14:23

T:  02/21/2020 16:27

JOB#:  [id number]

CC:

## 2020-02-21 NOTE — ANETHESIA PREOPERATIVE EVAL
Anesthesia Pre-op PMH/ROS


General


Date of Evaluation:  Feb 21, 2020


Time of Evaluation:  06:29


Anesthesiologist:  Cristi


ASA Score:  ASA 3


Mallampati Score


Class I : Soft palate, uvula, fauces, pillars visible


Class II: Soft palate, uvula, fauces visible


Class III: Soft palate, base of uvula visible


Class IV: Only hard plate visible


Mallampati Classification:  Class III


Surgeon:  Ronda


Diagnosis:  R Wrist Pain


Surgical Procedure:  R Wrist ORIF


Anesthesia History:  none


Family History:  no anesthesia problems


Allergies:  


Coded Allergies:  


     No Known Allergies (Unverified , 10/29/19)


Medications:  see eMAR


Patient NPO?:  Yes





Past Medical History


Cardiovascular:  Reports: HTN


Pulmonary:  Reports: asthma, COPD


Endocrine:  Reports: DM





Anesthesia Pre-op Phys. Exam


Physician Exam





Last Vital Signs








  Date Time  Temp Pulse Resp B/P (MAP) Pulse Ox O2 Delivery O2 Flow Rate FiO2


 


2/21/20 04:00       2.0 


 


2/21/20 04:00  88      


 


2/21/20 03:23      Nasal Cannula  


 


2/20/20 23:35 98.4  21 130/87 99   21








Constitutional:  NAD


Neurologic:  CN 2-12 intact


Cardiovascular:  RRR


Respiratory:  CTA


Gastrointestinal:  S/NT/ND





Airway Exam


Mallampati Score:  Class III


MO:  limited


ROM:  limited


Teeth:  missing, intact





Anesthesia Pre-op A/P


Labs





Hematology








Test


  2/20/20


09:40


 


White Blood Count


  10.1 K/UL


(4.8-10.8)


 


Red Blood Count


  4.53 M/UL


(4.20-5.40)


 


Hemoglobin


  12.1 G/DL


(12.0-16.0)


 


Hematocrit


  38.1 %


(37.0-47.0)


 


Mean Corpuscular Volume 84 FL (80-99)  


 


Mean Corpuscular Hemoglobin


  26.6 PG


(27.0-31.0)  L


 


Mean Corpuscular Hemoglobin


Concent 31.6 G/DL


(32.0-36.0)  L


 


Red Cell Distribution Width


  14.3 %


(11.6-14.8)


 


Platelet Count


  353 K/UL


(150-450)


 


Mean Platelet Volume


  5.6 FL


(6.5-10.1)  L


 


Neutrophils (%) (Auto)


  70.5 %


(45.0-75.0)


 


Lymphocytes (%) (Auto)


  19.4 %


(20.0-45.0)  L


 


Monocytes (%) (Auto)


  5.9 %


(1.0-10.0)


 


Eosinophils (%) (Auto)


  3.3 %


(0.0-3.0)  H


 


Basophils (%) (Auto)


  0.9 %


(0.0-2.0)








Chemistry








Test


  2/20/20


09:40


 


Sodium Level


  141 MMOL/L


(136-145)


 


Potassium Level


  4.7 MMOL/L


(3.5-5.1)


 


Chloride Level


  105 MMOL/L


()


 


Carbon Dioxide Level


  28 MMOL/L


(21-32)


 


Anion Gap


  8 mmol/L


(5-15)


 


Blood Urea Nitrogen


  17 mg/dL


(7-18)


 


Creatinine


  1.3 MG/DL


(0.55-1.30)


 


Estimat Glomerular Filtration


Rate 40.5 mL/min


(>60)


 


Glucose Level


  114 MG/DL


()  H


 


Calcium Level


  9.1 MG/DL


(8.5-10.1)


 


Total Bilirubin


  0.2 MG/DL


(0.2-1.0)


 


Aspartate Amino Transf


(AST/SGOT) 16 U/L (15-37)


 


 


Alanine Aminotransferase


(ALT/SGPT) 17 U/L (12-78)


 


 


Alkaline Phosphatase


  83 U/L


()


 


Troponin I


  0.000 ng/mL


(0.000-0.056)


 


Pro-B-Type Natriuretic Peptide


  251 pg/mL


(0-125)  H


 


Total Protein


  7.6 G/DL


(6.4-8.2)


 


Albumin


  3.1 G/DL


(3.4-5.0)  L


 


Globulin 4.5 g/dL  


 


Albumin/Globulin Ratio


  0.7 (1.0-2.7)


L











Risk Assessment & Plan


Assessment:


ASA 3


Plan:


GA with Block


Status Change Before Surgery:  No





Pre-Antibiotics


Drug:  Dequan Camacho MD Feb 21, 2020 06:47

## 2020-02-21 NOTE — CONSULTATION
DATE OF CONSULTATION:  02/21/2020

CARDIOLOGY CONSULTATION



CONSULTING PHYSICIAN:  Ajay Silva M.D.



REFERRING PHYSICIAN:  Osmin Acevedo M.D.



REASON FOR CONSULTATION:  Management of chest pain.



HISTORY OF PRESENT ILLNESS:  This is a very unfortunate 70-year-old lady

who presents to the hospital with complaints of shortness of breath.



The patient states that she has been suffering from asthma and most

likely she had a flare of this condition.  She complains of cough as well

as wheezing.  She also sustained mechanical fall with no loss of

consciousness with which she injured her right hand and forearm.  At the

time of arrival to this facility, blood pressure was 131/73 mmHg and heart

rate was 95.  Initial laboratory revealed BUN and creatinine of 17 and 1.3

as well as troponin I level of 0.  ProBNP was slightly elevated at 251.

The patient had a chest x-ray in the emergency department on 02/20/2020

which showed borderline cardiomegaly with mild interstitial congestion and

small right pleural effusion.  X-ray of the right hand confirmed distal

radial fracture.  The patient was admitted to telemetry for further

evaluation and management of shortness of breath as well as Orthopedic

consultation for right distal radius fracture.  Cardiology consultation

was made at request of Dr. Acevedo to address shortness of breath from

cardiac standpoint.



PAST MEDICAL HISTORY:  Asthma/COPD.



FAMILY HISTORY:  No premature coronary artery disease in first-degree

relatives.



PAST SURGICAL HISTORY:  None.



REVIEW OF SYSTEMS:  HEENT:  Denies any headache, diplopia, blurred vision.

CONSTITUTIONAL:  Denies any fever, chills, night sweats, or weight loss.

CARDIOVASCULAR:  Denies any chest pain.  Positive for shortness of breath.

Denies any PND, orthopnea, leg swelling, or syncope.   PULMONARY:  Denies

any hemoptysis.   Positive for cough and shortness of breath.

GASTROINTESTINAL:  Denies any nausea, vomiting, diarrhea, constipation,

abdominal pain, or GI bleed.    GENITOURINARY:  Denies any hematuria,

dysuria, incontinence.    NEUROLOGY:  Denies any motor dysfunction,

sensory deficit, or altered speech.    MUSCULOSKELETAL:  Right arm and

hand pain.



MEDICATIONS:  List of medication home includes albuterol inhaler two puffs

inhaled every six hours.



ALLERGIES:  No known drug allergies.



SOCIAL HISTORY:  Denies any tobacco, alcohol, or illicit drug use.



PHYSICAL EXAMINATION:

VITAL SIGNS:  Blood pressure was 131/73, pulse 95, respirations 22,

temperature 98.4 degrees Fahrenheit, O2 saturation 98% on room air.

GENERAL:  The patient is a very pleasant, 70-year-old female, in no

apparent respiratory distress.  Alert and oriented x4.

HEENT:  Atraumatic and normocephalic.  Anicteric.  Pupils are equal, round,

and reactive to light and accommodation.  Extraocular muscles intact.

NECK:  JVP less than 5 centimeter.  No carotid bruit.  Carotid upstrokes 2+

bilaterally.

CVS:  Normal S1 and S2.  Regular rate and rhythm.  No murmurs, gallops, or

rubs.  PMI is at fourth space in midclavicular line.

LUNGS:  Clear to auscultation bilaterally.

ABDOMEN:  Soft, nontender, and nondistended.  No hepatosplenomegaly.

Positive bowel sounds.

EXTREMITIES:  No evidence of edema, clubbing, or cyanosis.



LABORATORY FINDINGS:  WBC 10.1, hemoglobin 12.1, hematocrit 38.1, platelet

count is 353.  Sodium 141, potassium 4.7, chloride 105, bicarbonate 28,

BUN of 17, creatinine 1.3, glucose 114, calcium is 9.1.  Troponin I is 0.

ProBNP was 251.



ASSESSMENT AND PLAN:  The patient is a very unfortunate 70-year-old female,

seen in Cardiology consultation.



1. Shortness of breath.  It is likely due to acute exacerbation of

asthma.  The patient had 2D echocardiography from her last admission to

this facility in October 2019.  She had normal LV systolic function with

LVEF of 55%.  At this time, the patient does not have any active cardiac

issues.  Troponin I level is 0.  A 12-lead electrocardiogram does not show

any acute ischemic features.  No further cardiac workup is required.

2. Right distal radius fracture, Orthopedic consultation.



I would like to thank, Dr. Acevedo, for allowing me to participate in

the care of this patient.









  ______________________________________________

  Ajay Silva M.D.





DR:  Aamir

D:  02/21/2020 16:02

T:  02/21/2020 17:49

JOB#:  [id number]

CC:

## 2020-02-22 VITALS — SYSTOLIC BLOOD PRESSURE: 143 MMHG | DIASTOLIC BLOOD PRESSURE: 89 MMHG

## 2020-02-22 VITALS — DIASTOLIC BLOOD PRESSURE: 74 MMHG | SYSTOLIC BLOOD PRESSURE: 181 MMHG

## 2020-02-22 VITALS — DIASTOLIC BLOOD PRESSURE: 84 MMHG | SYSTOLIC BLOOD PRESSURE: 155 MMHG

## 2020-02-22 VITALS — DIASTOLIC BLOOD PRESSURE: 87 MMHG | SYSTOLIC BLOOD PRESSURE: 172 MMHG

## 2020-02-22 VITALS — SYSTOLIC BLOOD PRESSURE: 147 MMHG | DIASTOLIC BLOOD PRESSURE: 79 MMHG

## 2020-02-22 VITALS — DIASTOLIC BLOOD PRESSURE: 101 MMHG | SYSTOLIC BLOOD PRESSURE: 191 MMHG

## 2020-02-22 RX ADMIN — DOCUSATE SODIUM SCH MG: 100 CAPSULE, LIQUID FILLED ORAL at 09:23

## 2020-02-22 RX ADMIN — SODIUM CHLORIDE SCH MLS/HR: 0.9 INJECTION INTRAVENOUS at 02:27

## 2020-02-22 RX ADMIN — OXYCODONE HYDROCHLORIDE SCH MG: 20 TABLET, FILM COATED, EXTENDED RELEASE ORAL at 21:10

## 2020-02-22 RX ADMIN — CELECOXIB SCH MG: 200 CAPSULE ORAL at 09:22

## 2020-02-22 RX ADMIN — HYDRALAZINE HYDROCHLORIDE PRN MG: 25 TABLET ORAL at 13:58

## 2020-02-22 RX ADMIN — DOCUSATE SODIUM SCH MG: 100 CAPSULE, LIQUID FILLED ORAL at 13:12

## 2020-02-22 RX ADMIN — SODIUM CHLORIDE SCH MLS/HR: 0.9 INJECTION INTRAVENOUS at 10:35

## 2020-02-22 RX ADMIN — DOCUSATE SODIUM SCH MG: 100 CAPSULE, LIQUID FILLED ORAL at 17:57

## 2020-02-22 RX ADMIN — OXYCODONE HYDROCHLORIDE SCH MG: 20 TABLET, FILM COATED, EXTENDED RELEASE ORAL at 09:24

## 2020-02-22 NOTE — CARDIOLOGY PROGRESS NOTE
Assessment/Plan


Assessment/Plan


1. Shortness of breath, likely due to acute exacerbation of asthma, 2D 

echocardiography with normal LV systolic function with LVEF of 55%.  At this 

time, the patient does not have any active cardiac issues.  Troponin I level is 

0.  A 12-lead electrocardiogram does not show any acute ischemic features.  No 

further cardiac workup is required.


2. Right distal radius fracture, s/p right wrist ORF.





Subjective


Subjective


Sinus rhythm at rate of 73.


s/p right wrist ORIF.





Objective





Last 24 Hour Vital Signs








  Date Time  Temp Pulse Resp B/P (MAP) Pulse Ox O2 Delivery O2 Flow Rate FiO2


 


2/22/20 16:00       2.0 


 


2/22/20 13:58    181/74    


 


2/22/20 12:00 98.9 73 19 181/74 (109) 97   


 


2/22/20 12:00       2.0 


 


2/22/20 11:30  93      


 


2/22/20 09:29  76 24  97   


 


2/22/20 08:46      Nasal Cannula 2.0 


 


2/22/20 08:00 98.7 85 18 172/87 (115) 97   


 


2/22/20 08:00       2.0 


 


2/22/20 07:45  88      


 


2/22/20 04:00       2.0 


 


2/22/20 04:00  93      


 


2/22/20 04:00 97.9 78 23 143/89 (107) 96   


 


2/22/20 00:00       2.0 


 


2/22/20 00:00  95      


 


2/22/20 00:00 97.9 82 23 191/101 (131) 96   


 


2/21/20 23:51    189/101    


 


2/21/20 21:00      Nasal Cannula 3.0 


 


2/21/20 20:00  93      


 


2/21/20 20:00 96.6 90 22 136/82 (100) 95   


 


2/21/20 20:00       2.0 

















Intake and Output  


 


 2/21/20 2/22/20





 19:00 07:00


 


Intake Total 300 ml 250 ml


 


Output Total 10 ml 


 


Balance 290 ml 250 ml


 


  


 


IV Total 300 ml 


 


Other  250 ml


 


Output Estimated Blood Loss 10 ml 


 


# Voids  3








Objective


HEENT:  Atraumatic and normocephalic.  Anicteric.  Pupils are equal, round,


and reactive to light and accommodation.  Extraocular muscles intact.


NECK:  JVP less than 5 centimeter.  No carotid bruit.  Carotid upstrokes 2+


bilaterally.


CVS:  Normal S1 and S2.  Regular rate and rhythm.  No murmurs, gallops, or


rubs.  PMI is at fourth space in midclavicular line.


LUNGS:  Clear to auscultation bilaterally.


ABDOMEN:  Soft, nontender, and nondistended.  No hepatosplenomegaly.


Positive bowel sounds.


EXTREMITIES:  No evidence of edema, clubbing, or cyanosis.











Ajay Silva MD Feb 22, 2020 17:40

## 2020-02-22 NOTE — NUR
PT Note

PT edil completed, treatment initiated. Patient was lethargic but able to follow 
instructions. She was able toto come to sit at the EOB and stand with 2-person assist. 
Patient needs physical therapy to increase her muscle strength and balance to improve her 
functional mobility to enable her to return home with her .

-------------------------------------------------------------------------------

Addendum: 02/22/20 at 1541 by PATRICIA GALVAN PT

-------------------------------------------------------------------------------

Amended: Links added.

## 2020-02-22 NOTE — NUR
NURSE NOTES:

Received report from RADHA Torrez. Patient is awake and responsive, at times appears drowsy. 
Breathing regular and unlabored with no S/S of SOB noted at this time. Patient is C/O right 
sided arm pain, around the clock pain medication has been given per MD orders. IV access is 
on the L hand, 22G, patent, intact, and running fluids at prescribed rate. Patient has a 
sling on the R arm per MD orders. Tried to assess for circulation, but patient will not 
allow. Per the family member "The Doctor saw her and already checked. She doesn't like to be 
touched." Bed remains in the lowest position, breaks engaged, and call light is within reach 
at all times. All other needs attended to, patient remains stable, will continue to monitor.

## 2020-02-22 NOTE — PULMONOLOGY PROGRESS NOTE
Assessment/Plan


Assessment/Plan


IMPRESSION:


1. Bronchial asthma, stable.


2. Status post ORIF, right wrist fracture.


3.DMitus.





DISCUSSION:  Continue current medications and care.


I will follow as pulmonologist.  Continue oxygen and pulmonary


hygiene.  














  ______________________________________________


  Bill Montana M.D.





Subjective


Interval Events:  None new reported


Constitutional:  Reports: no symptoms


HEENT:  Repors: no symptoms


Respiratory:  Reports: no symptoms


Cardiovascular:  Reports: no symptoms


Gastrointestinal/Abdominal:  Reports: no symptoms


Allergies:  


Coded Allergies:  


     No Known Allergies (Unverified , 10/29/19)





Objective





Last 24 Hour Vital Signs








  Date Time  Temp Pulse Resp B/P (MAP) Pulse Ox O2 Delivery O2 Flow Rate FiO2


 


2/22/20 09:29  76 24  97   


 


2/22/20 08:46      Nasal Cannula 2.0 


 


2/22/20 08:00 98.7 85 18 172/87 (115) 97   


 


2/22/20 08:00       2.0 


 


2/22/20 07:45  88      


 


2/22/20 04:00       2.0 


 


2/22/20 04:00  93      


 


2/22/20 04:00 97.9 78 23 143/89 (107) 96   


 


2/22/20 00:00       2.0 


 


2/22/20 00:00  95      


 


2/22/20 00:00 97.9 82 23 191/101 (131) 96   


 


2/21/20 23:51    189/101    


 


2/21/20 21:00      Nasal Cannula 3.0 


 


2/21/20 20:00  93      


 


2/21/20 20:00 96.6 90 22 136/82 (100) 95   


 


2/21/20 20:00       2.0 


 


2/21/20 16:00  87      


 


2/21/20 16:00 98.1 88 15 143/65 100 Nasal Cannula 3 


 


2/21/20 16:00       2.0 


 


2/21/20 15:59 98.1       


 


2/21/20 15:45  87 15 140/65 100 Nasal Cannula 3 


 


2/21/20 15:30  84 16 178/81 100 Nasal Cannula 3 


 


2/21/20 15:24 97.6       


 


2/21/20 15:21    192/84    


 


2/21/20 15:19  81 16 180/80 100 Nasal Cannula 3 


 


2/21/20 15:09  80 15 187/86 100 Nasal Cannula 3 


 


2/21/20 14:59  82 16 179/91 100 Simple Mask 6 


 


2/21/20 14:54  81 15 178/94 100 Simple Mask 6 


 


2/21/20 14:49 98.4 86 16 196/104 100 Simple Mask 6 


 


2/21/20 14:46  81 16  100   

















Intake and Output  


 


 2/21/20 2/22/20





 19:00 07:00


 


Intake Total 300 ml 250 ml


 


Output Total 10 ml 


 


Balance 290 ml 250 ml


 


  


 


IV Total 300 ml 


 


Other  250 ml


 


Output Estimated Blood Loss 10 ml 


 


# Voids  3








General Appearance:  no acute distress


HEENT:  normocephalic


Respiratory/Chest:  chest wall non-tender


Cardiovascular:  normal peripheral pulses


Abdomen:  normal bowel sounds





Current Medications








 Medications


  (Trade)  Dose


 Ordered  Sig/Brenda


 Route


 PRN Reason  Start Time


 Stop Time Status Last Admin


Dose Admin


 


 Acetaminophen


  (Tylenol)  650 mg  EVERY 6 HOURS  PRN


 ORAL


 Temp > 100.5  2/21/20 13:15


 3/22/20 13:14   


 


 


 Acetaminophen/


 Hydrocodone Bitart


  (Norco 5/325)  1 tab  Q4H  PRN


 ORAL


 Mild Pain (Pain Scale 1-3)  2/21/20 13:15


 2/28/20 13:14   


 


 


 Albuterol/


 Ipratropium


  (Albuterol/


 Ipratropium)  3 ml  Q2HRT  PRN


 HHN


 Bronchospasm  2/21/20 03:15


 2/26/20 03:14   


 


 


 Aspirin


  (ASA)  325 mg  BID


 ORAL


   2/22/20 09:00


 3/23/20 08:59  2/22/20 09:23


 


 


 Celecoxib


  (CeleBREX)  200 mg  DAILY


 ORAL


   2/22/20 09:00


 3/23/20 08:59  2/22/20 09:22


 


 


 Docusate Sodium


  (Colace)  100 mg  THREE TIMES A  DAY


 ORAL


   2/21/20 18:00


 3/22/20 17:59  2/22/20 09:23


 


 


 Hydralazine HCl


  (Apresoline)  25 mg  Q6H  PRN


 ORAL


 For High Blood Pressure  2/21/20 11:45


 3/22/20 11:44  2/21/20 23:51


 


 


 Hydromorphone HCl


  (Dilaudid)  0.5 mg  Q4H  PRN


 SUBQ


 Moderate Pain (Pain Scale 4-6)  2/21/20 13:15


 2/28/20 13:14   


 


 


 Hydromorphone HCl


  (Dilaudid)  1 mg  Q3H  PRN


 SUBQ


 Severe Pain (Pain Scale 7-10)  2/21/20 13:15


 2/28/20 13:14   


 


 


 Oxycodone HCl


  (OxyCONTIN)  20 mg  EVERY 12  HOURS


 ORAL


   2/21/20 21:00


 2/28/20 20:59  2/22/20 09:24


 


 


 Pantoprazole


  (Protonix)  40 mg  EVERY 12  HOURS


 ORAL


   2/21/20 21:00


 3/22/20 20:59  2/22/20 09:23


 


 


 Sodium Chloride  1,000 ml @ 


 80 mls/hr  [id number]


 IV


   2/21/20 03:15


 3/22/20 03:14  2/21/20 16:35


 

















Bill Montana MD Feb 22, 2020 12:25

## 2020-02-22 NOTE — 48 HOUR POST ANESTHESIA EVAL
Post Anesthesia Evaluation


Procedure:  ORIF R Wrist


Date of Evaluation:  Feb 22, 2020


Time of Evaluation:  09:27


Blood Pressure Systolic:  156


0:  78


Pulse Rate:  76


Respiratory Rate:  24


Temperature (Fahrenheit):  97.8


O2 Sat by Pulse Oximetry:  97


Airway:  patent


Nausea:  No


Vomiting:  No


Pain Intensity:  3


Hydration Status:  adequate


Cardiopulmonary Status:


stable mild dyspnea at rest, bilateral wheezing over lower lobes


Mental Status/LOC:  patient returned to baseline


Follow-up Care/Observations:


n/a


Post-Anesthesia Complications:


none


Follow-up care needed:  N/A











Avery Xavier MD Feb 22, 2020 09:28

## 2020-02-23 VITALS — DIASTOLIC BLOOD PRESSURE: 80 MMHG | SYSTOLIC BLOOD PRESSURE: 149 MMHG

## 2020-02-23 VITALS — DIASTOLIC BLOOD PRESSURE: 77 MMHG | SYSTOLIC BLOOD PRESSURE: 165 MMHG

## 2020-02-23 VITALS — DIASTOLIC BLOOD PRESSURE: 69 MMHG | SYSTOLIC BLOOD PRESSURE: 150 MMHG

## 2020-02-23 VITALS — SYSTOLIC BLOOD PRESSURE: 168 MMHG | DIASTOLIC BLOOD PRESSURE: 82 MMHG

## 2020-02-23 VITALS — SYSTOLIC BLOOD PRESSURE: 148 MMHG | DIASTOLIC BLOOD PRESSURE: 87 MMHG

## 2020-02-23 VITALS — SYSTOLIC BLOOD PRESSURE: 162 MMHG | DIASTOLIC BLOOD PRESSURE: 90 MMHG

## 2020-02-23 LAB
ADD MANUAL DIFF: NO
BASOPHILS NFR BLD AUTO: 1.1 % (ref 0–2)
EOSINOPHIL NFR BLD AUTO: 2.5 % (ref 0–3)
ERYTHROCYTE [DISTWIDTH] IN BLOOD BY AUTOMATED COUNT: 16.4 % (ref 11.6–14.8)
HCT VFR BLD CALC: 37 % (ref 37–47)
HGB BLD-MCNC: 11.5 G/DL (ref 12–16)
LYMPHOCYTES NFR BLD AUTO: 17.1 % (ref 20–45)
MCV RBC AUTO: 84 FL (ref 80–99)
MONOCYTES NFR BLD AUTO: 5.7 % (ref 1–10)
NEUTROPHILS NFR BLD AUTO: 73.7 % (ref 45–75)
PLATELET # BLD: 335 K/UL (ref 150–450)
RBC # BLD AUTO: 4.39 M/UL (ref 4.2–5.4)
WBC # BLD AUTO: 10.6 K/UL (ref 4.8–10.8)

## 2020-02-23 RX ADMIN — HYDRALAZINE HYDROCHLORIDE PRN MG: 25 TABLET ORAL at 21:57

## 2020-02-23 RX ADMIN — OXYCODONE HYDROCHLORIDE SCH MG: 10 TABLET, FILM COATED, EXTENDED RELEASE ORAL at 21:04

## 2020-02-23 RX ADMIN — DOCUSATE SODIUM SCH MG: 100 CAPSULE, LIQUID FILLED ORAL at 08:41

## 2020-02-23 RX ADMIN — DOCUSATE SODIUM SCH MG: 100 CAPSULE, LIQUID FILLED ORAL at 13:00

## 2020-02-23 RX ADMIN — DOCUSATE SODIUM SCH MG: 100 CAPSULE, LIQUID FILLED ORAL at 18:00

## 2020-02-23 RX ADMIN — OXYCODONE HYDROCHLORIDE SCH MG: 10 TABLET, FILM COATED, EXTENDED RELEASE ORAL at 08:42

## 2020-02-23 RX ADMIN — CELECOXIB SCH MG: 200 CAPSULE ORAL at 08:42

## 2020-02-23 NOTE — GENERAL PROGRESS NOTE
Assessment/Plan


Assessment/Plan:


Covering Dr. Golden





Assesment and Recs:


# Right wrist fracture that requires surgery with ortho


--> s/p splint placement and ORIF surgery


--> asthma control


--> cardiac cleared


# COPD exacerbation history


--> as per Dr. Montana


# Anemia of chronic disease


--> eval prn basis


--> hgb 11


# Electrolyte abnormality


--> per Dr. Celeste


# Asthma


--> breathing rx and steriods as per Dr. Macario MARTINEZ


--> requires better control pror to surgery


# Hypertension


--> dw Cards


# Morbid obesity


--> recommend weight loss


# Hyperglycemia , likely secondary to steroid


--> if worse endo





Appreciate consultant care and dw rn





Subjective


HEENT:  Denies: no symptoms, eye pain, blurred vision, tearing, double vision, 

ear pain, ear discharge, nose pain, nose congestion, throat pain, throat 

swelling, mouth pain, mouth swelling, other


Cardiovascular:  Denies: no symptoms, chest pain, edema, irregular heart rate, 

lightheadedness, palpitations, syncope, other


Respiratory:  Denies: no symptoms, cough, orthopnea, shortness of breath, SOB 

with excertion, SOB at rest, sputum, stridor, wheezing, other


Gastrointestinal/Abdominal:  Denies: no symptoms, abdomen distended, abdominal 

pain, black stools, tarry stools, blood in stool, constipated, diarrhea, 

difficulty swallowing, nausea, poor appetite, poor fluid intake, rectal bleeding

, vomiting, other


Genitourinary:  Denies: no symptoms, burning, discharge, frequency, flank pain, 

hematuria, incontinence, pain, urgency, other


Neurologic/Psychiatric:  Denies: no symptoms, anxiety, depressed, emotional 

problems, headache, numbness, paresthesia, pre-existing deficit, seizure, 

tingling, tremors, weakness, other


Endocrine:  Denies: no symptoms, excessive sweating, flushing, intolerance to 

cold, intolerance to heat, increased hunger, increased thirst, increased urine, 

unexplained weight gain, unexplained weight loss, other


Hematologic/Lymphatic:  Denies: no symptoms, anemia, easy bleeding, easy 

bruising, other


Allergies:  


Coded Allergies:  


     No Known Allergies (Unverified , 10/29/19)


Subjective


2/23: no bleeding or chills, breathing treatments prn, hgb lower, fatigued





Objective





Last 24 Hour Vital Signs








  Date Time  Temp Pulse Resp B/P (MAP) Pulse Ox O2 Delivery O2 Flow Rate FiO2


 


2/23/20 04:00       2.0 


 


2/23/20 04:00  97      


 


2/23/20 04:00 98.6 93 23 150/69 (96) 96   


 


2/23/20 00:00       2.0 


 


2/23/20 00:00  89      


 


2/23/20 00:00 97.8 92 23 149/80 (103) 95   


 


2/22/20 21:00      Nasal Cannula 2.0 


 


2/22/20 20:00  94      


 


2/22/20 20:00 97.7 97 22 155/84 (107) 95   


 


2/22/20 20:00       2.0 


 


2/22/20 19:59     96 Nasal Cannula 2.0 28


 


2/22/20 19:59  95 20  96 Nasal Cannula 2.0 28


 


2/22/20 16:00       2.0 


 


2/22/20 16:00 98.5 87 20 147/79 (101) 98   


 


2/22/20 15:25  85      


 


2/22/20 13:58    181/74    


 


2/22/20 12:00 98.9 73 19 181/74 (109) 97   


 


2/22/20 12:00       2.0 


 


2/22/20 11:30  93      


 


2/22/20 09:29  76 24  97   


 


2/22/20 08:46      Nasal Cannula 2.0 

















Intake and Output  


 


 2/22/20 2/23/20





 19:00 07:00


 


Intake Total 480 ml 


 


Balance 480 ml 


 


  


 


Intake Oral 480 ml 


 


# Voids 4 2








Height (Feet):  5


Height (Inches):  3.00


Weight (Pounds):  264


Objective





Physical Exam:   


Vitals: reviewed   


General: NAD   


HEENT: nc, at   


Neck: supple   


Chest: clear breath sounds bilaterally   


Cardiovascular: RRR, no s3, s4   


Abdomen: soft, nontender, nd   


Extremities: no cce, normal range of motion ++ ecchymoses right wrist s/p splint


Neuro: alert and oriented











Osmin Acevedo MD Feb 23, 2020 08:02

## 2020-02-23 NOTE — NUR
NURSE NOTES:

Called lab. to f/u for CBC because it says "in process" but according to them pt. refused 
blood to be drawn. Told . regarding another order and try to draw again. Talked to 
pt. regarding the blood draw again and said no to RN. Explained MD needs to f/u H/H after 
surgery. Family at bedside told RN that he will talk to her and convince her. Will cont. to 
monitor.

## 2020-02-23 NOTE — ORTHOPEDIC PROGRESS NOTE
Orthopedic - Progress Note


Subjective


Additional Comments


sx Friday. labs ordered for today and yesterday- not done. need to follow H&H





Objective





Last 24 Hour Vital Signs








  Date Time  Temp Pulse Resp B/P (MAP) Pulse Ox O2 Delivery O2 Flow Rate FiO2


 


2/23/20 12:00 98.7 88 20 165/77 (106) 99   


 


2/23/20 12:00       2.0 


 


2/23/20 09:00      Nasal Cannula 2.0 


 


2/23/20 08:00 98.6 90 21 168/82 (110) 95   


 


2/23/20 08:00       2.0 


 


2/23/20 07:45  89      


 


2/23/20 07:20  85 20  97 Nasal Cannula 2.0 28


 


2/23/20 07:20     97 Nasal Cannula 2.0 28


 


2/23/20 04:00       2.0 


 


2/23/20 04:00  97      


 


2/23/20 04:00 98.6 93 23 150/69 (96) 96   


 


2/23/20 00:00       2.0 


 


2/23/20 00:00  89      


 


2/23/20 00:00 97.8 92 23 149/80 (103) 95   


 


2/22/20 21:00      Nasal Cannula 2.0 


 


2/22/20 20:00  94      


 


2/22/20 20:00 97.7 97 22 155/84 (107) 95   


 


2/22/20 20:00       2.0 


 


2/22/20 19:59     96 Nasal Cannula 2.0 28


 


2/22/20 19:59  95 20  96 Nasal Cannula 2.0 28


 


2/22/20 16:00       2.0 


 


2/22/20 16:00 98.5 87 20 147/79 (101) 98   


 


2/22/20 15:25  85      


 


2/22/20 13:58    181/74    

















Intake and Output  


 


 2/22/20 2/23/20





 19:00 07:00


 


Intake Total 480 ml 


 


Balance 480 ml 


 


  


 


Intake Oral 480 ml 


 


# Voids 4 2








Wound:  other - Rt UE splint and sling


Additional Comments


xray reviewed: fracture reduced well with hardware





Assessment


Post-op Diagnosis


POD 2


Procedure Performed


rt wrist ORIF





Plan


Plan:  discharge to home - ok for dc per ortho. outpt follow up in 7-10 days 

for casst placement. , other - ordered CBC stat to follow H&H.











Estephania Cadena Feb 23, 2020 12:38

## 2020-02-23 NOTE — NUR
HAND-OFF: 

Report given to RADHA Shaikh. Patient in stable condition.

-------------------------------------------------------------------------------

Addendum: 02/23/20 at 0718 by Sri Ji RN

-------------------------------------------------------------------------------

HAND-OFF: 

Report given to RADHA Chiu. Patient in stable condition.

## 2020-02-23 NOTE — NUR
NURSE NOTES:

Offered assistance with changing the patient and assisting with hygiene, but per family 
member patient doesn't want to be touched. Family member likes to do things for the patient 
himself. Offered assistance again to provide hygiene care for the patient, will follow up 
later to see if patient is ready to be changed.

## 2020-02-23 NOTE — PULMONOLOGY PROGRESS NOTE
Assessment/Plan


Assessment/Plan


IMPRESSION:


1. Bronchial asthma, stable.


2. Status post ORIF, right wrist fracture.


3. DM.





DISCUSSION:  Continue current medications and care.


I will follow as pulmonologist.  Continue oxygen and pulmonary


hygiene.  














  ______________________________________________


  Bill Montana M.D.





Subjective


Interval Events:  None new


Constitutional:  Reports: no symptoms


HEENT:  Repors: no symptoms


Respiratory:  Reports: no symptoms


Cardiovascular:  Reports: no symptoms


Gastrointestinal/Abdominal:  Reports: no symptoms


Allergies:  


Coded Allergies:  


     No Known Allergies (Unverified , 10/29/19)





Objective





Last 24 Hour Vital Signs








  Date Time  Temp Pulse Resp B/P (MAP) Pulse Ox O2 Delivery O2 Flow Rate FiO2


 


2/23/20 12:00 98.7 88 20 165/77 (106) 99   


 


2/23/20 12:00       2.0 


 


2/23/20 11:43  86      


 


2/23/20 09:00      Nasal Cannula 2.0 


 


2/23/20 08:00 98.6 90 21 168/82 (110) 95   


 


2/23/20 08:00       2.0 


 


2/23/20 07:45  89      


 


2/23/20 07:20  85 20  97 Nasal Cannula 2.0 28


 


2/23/20 07:20     97 Nasal Cannula 2.0 28


 


2/23/20 04:00       2.0 


 


2/23/20 04:00  97      


 


2/23/20 04:00 98.6 93 23 150/69 (96) 96   


 


2/23/20 00:00       2.0 


 


2/23/20 00:00  89      


 


2/23/20 00:00 97.8 92 23 149/80 (103) 95   


 


2/22/20 21:00      Nasal Cannula 2.0 


 


2/22/20 20:00  94      


 


2/22/20 20:00 97.7 97 22 155/84 (107) 95   


 


2/22/20 20:00       2.0 


 


2/22/20 19:59     96 Nasal Cannula 2.0 28


 


2/22/20 19:59  95 20  96 Nasal Cannula 2.0 28


 


2/22/20 16:00       2.0 


 


2/22/20 16:00 98.5 87 20 147/79 (101) 98   

















Intake and Output  


 


 2/22/20 2/23/20





 19:00 07:00


 


Intake Total 480 ml 


 


Balance 480 ml 


 


  


 


Intake Oral 480 ml 


 


# Voids 4 2








General Appearance:  no acute distress


HEENT:  normocephalic


Respiratory/Chest:  chest wall non-tender


Cardiovascular:  normal peripheral pulses


Abdomen:  normal bowel sounds





Current Medications








 Medications


  (Trade)  Dose


 Ordered  Sig/Brenda


 Route


 PRN Reason  Start Time


 Stop Time Status Last Admin


Dose Admin


 


 Acetaminophen


  (Tylenol)  650 mg  EVERY 6 HOURS  PRN


 ORAL


 Temp > 100.5  2/21/20 13:15


 3/22/20 13:14   


 


 


 Acetaminophen/


 Hydrocodone Bitart


  (Norco 5/325)  1 tab  Q4H  PRN


 ORAL


 Mild Pain (Pain Scale 1-3)  2/21/20 13:15


 2/28/20 13:14   


 


 


 Albuterol/


 Ipratropium


  (Albuterol/


 Ipratropium)  3 ml  Q2HRT  PRN


 HHN


 Bronchospasm  2/21/20 03:15


 2/26/20 03:14   


 


 


 Aspirin


  (ASA)  325 mg  BID


 ORAL


   2/22/20 09:00


 3/23/20 08:59  2/23/20 08:41


 


 


 Celecoxib


  (CeleBREX)  200 mg  DAILY


 ORAL


   2/22/20 09:00


 3/23/20 08:59  2/23/20 08:42


 


 


 Docusate Sodium


  (Colace)  100 mg  THREE TIMES A  DAY


 ORAL


   2/21/20 18:00


 3/22/20 17:59  2/23/20 08:41


 


 


 Hydralazine HCl


  (Apresoline)  25 mg  Q6H  PRN


 ORAL


 For High Blood Pressure  2/21/20 11:45


 3/22/20 11:44  2/22/20 13:58


 


 


 Hydromorphone HCl


  (Dilaudid)  0.5 mg  Q4H  PRN


 SUBQ


 Moderate Pain (Pain Scale 4-6)  2/21/20 13:15


 2/28/20 13:14   


 


 


 Hydromorphone HCl


  (Dilaudid)  1 mg  Q3H  PRN


 SUBQ


 Severe Pain (Pain Scale 7-10)  2/21/20 13:15


 2/28/20 13:14   


 


 


 Oxycodone HCl


  (OxyCONTIN)  10 mg  EVERY 12  HOURS


 ORAL


   2/23/20 09:00


 2/28/20 20:59  2/23/20 08:42


 


 


 Pantoprazole


  (Protonix)  40 mg  EVERY 12  HOURS


 ORAL


   2/21/20 21:00


 3/22/20 20:59  2/23/20 08:41


 


 


 Sodium Chloride  1,000 ml @ 


 80 mls/hr  [id number]


 IV


   2/21/20 03:15


 3/22/20 03:14  2/22/20 21:08


 

















Bill Montana MD Feb 23, 2020 15:42

## 2020-02-23 NOTE — NUR
NURSE NOTES:

Received bedside report from Mariann GARCIA. Pt. in bed, awake, a/o x 4. No sign of distress. 
C/O mild discomfort at her right arm. Sling in placed. IV at left hand #22g. in placed 
patent/intact running NS at 80cc/hr. Bed in low position, locked. Call light within reach. 
Will cont. to monitor.

## 2020-02-23 NOTE — NUR
HAND-OFF: 

Report given to Mariann GARCIA. Pt. remain stable. Endorsed to next shift on ortho stand point 
pt. ok for d/c.

## 2020-02-23 NOTE — NUR
NURSE NOTES:

Received report from RADHA Chiu. Patient is in bed, awake and responsive. Breathing regular and 
unlabored on 2L NC with no S/S of SOB noted at this time. Patient denies any pain or 
discomfort at this time. Family member remains at bed-side and will stay over night. Family 
member aware that patient has been cleared for D/C by ortho, but still needs cardiology and 
primary MD to assess patient for clearance. Family member stated "We're ready to get out of 
here, we thought we would go tonight." Explained to family member about clearance before 
D/C, verbalized understanding. Bed remains in the lowest position, breaks engaged, and call 
light is within reach at all times. All other needs attended to, patient remains stable, 
will continue to monitor.

## 2020-02-24 VITALS — SYSTOLIC BLOOD PRESSURE: 165 MMHG | DIASTOLIC BLOOD PRESSURE: 79 MMHG

## 2020-02-24 VITALS — DIASTOLIC BLOOD PRESSURE: 79 MMHG | SYSTOLIC BLOOD PRESSURE: 125 MMHG

## 2020-02-24 VITALS — DIASTOLIC BLOOD PRESSURE: 79 MMHG | SYSTOLIC BLOOD PRESSURE: 139 MMHG

## 2020-02-24 RX ADMIN — CELECOXIB SCH MG: 200 CAPSULE ORAL at 08:33

## 2020-02-24 RX ADMIN — OXYCODONE HYDROCHLORIDE SCH MG: 10 TABLET, FILM COATED, EXTENDED RELEASE ORAL at 08:34

## 2020-02-24 RX ADMIN — DOCUSATE SODIUM SCH MG: 100 CAPSULE, LIQUID FILLED ORAL at 08:34

## 2020-02-24 NOTE — NUR
NURSE NOTES:

pt awake alert, no distress. no sob. no c/o pain. call light within reach. bed in lowest 
position, locked.

## 2020-02-24 NOTE — NUR
NURSE NOTES:

Patient allowed the RN and the nursing assistant to change her gown and clean her up a bit. 
Patient comfortably resting in bed, will continue to monitor.

## 2020-02-24 NOTE — HEMATOLOGY/ONC PROGRESS NOTE
Assessment/Plan


Assessment/Plan





Assesment and Recs:


# Anemia of chronic disease


--> eval prn basis


--> hgb 11


--> stable for dc


# Right wrist fracture that requires surgery with ortho


--> s/p splint placement and ORIF surgery


--> asthma control


--> cardiac cleared


# COPD exacerbation history


--> as per Dr. Montana


# Electrolyte abnormality


--> per Dr. Celeste


# Asthma


--> breathing rx and steriods as per Dr. Macario MARTINEZ


--> requires better control pror to surgery


# Hypertension


--> dw Cards


# Morbid obesity


--> recommend weight loss


# Hyperglycemia , likely secondary to steroid


--> if worse endo





Appreciate consultant care and dw rn





Subjective


Constitutional:  Denies: no symptoms, chills, fever, malaise, weakness, other


HEENT:  Denies: no symptoms, eye pain, blurred vision, tearing, double vision, 

ear pain, ear discharge, nose pain, nose congestion, throat pain, throat 

swelling, mouth pain, mouth swelling, other


Cardiovascular:  Denies: no symptoms, chest pain, edema, irregular heart rate, 

lightheadedness, palpitations, syncope, other


Respiratory:  Denies: no symptoms, cough, shortness of breath, SOB with 

excertion, SOB at rest, sputum, wheezing, other


Genitourinary:  Denies: no symptoms, burning, discharge, frequency, flank pain, 

hematuria, incontinence, pain, urgency, other


Neurologic/Psychiatric:  Denies: no symptoms, anxiety, depressed, emotional 

problems, headache, numbness, paresthesia, pre-existing deficit, seizure, 

tingling, tremors, weakness, other


Endocrine:  Denies: no symptoms, excessive sweating, flushing, intolerance to 

cold, intolerance to heat, increased hunger, increased thirst, increased urine, 

unexplained weight gain, unexplained weight loss, other


Hematologic/Lymphatic:  Denies: no symptoms, anemia, easy bleeding, easy 

bruising, adenopathy, other


Allergies:  


Coded Allergies:  


     No Known Allergies (Unverified , 10/29/19)


Subjective


2/23: no bleeding or chills, breathing treatments prn, hgb lower, fatigued


2/24: cleared by ortho, labs noted, no bleeding





Objective


Objective





Current Medications








 Medications


  (Trade)  Dose


 Ordered  Sig/Brenda


 Route


 PRN Reason  Start Time


 Stop Time Status Last Admin


Dose Admin


 


 Acetaminophen


  (Tylenol)  650 mg  EVERY 6 HOURS  PRN


 ORAL


 Temp > 100.5  2/21/20 13:15


 3/22/20 13:14   


 


 


 Acetaminophen/


 Hydrocodone Bitart


  (Norco 5/325)  1 tab  Q4H  PRN


 ORAL


 Mild Pain (Pain Scale 1-3)  2/21/20 13:15


 2/28/20 13:14   


 


 


 Albuterol/


 Ipratropium


  (Albuterol/


 Ipratropium)  3 ml  Q2HRT  PRN


 HHN


 Bronchospasm  2/21/20 03:15


 2/26/20 03:14  2/23/20 21:24


 


 


 Aspirin


  (ASA)  325 mg  BID


 ORAL


   2/22/20 09:00


 3/23/20 08:59  2/24/20 08:32


 


 


 Celecoxib


  (CeleBREX)  200 mg  DAILY


 ORAL


   2/22/20 09:00


 3/23/20 08:59  2/24/20 08:33


 


 


 Docusate Sodium


  (Colace)  100 mg  THREE TIMES A  DAY


 ORAL


   2/21/20 18:00


 3/22/20 17:59  2/24/20 08:34


 


 


 Hydralazine HCl


  (Apresoline)  25 mg  Q6H  PRN


 ORAL


 For High Blood Pressure  2/21/20 11:45


 3/22/20 11:44  2/23/20 21:57


 


 


 Hydromorphone HCl


  (Dilaudid)  0.5 mg  Q4H  PRN


 SUBQ


 Moderate Pain (Pain Scale 4-6)  2/21/20 13:15


 2/28/20 13:14   


 


 


 Hydromorphone HCl


  (Dilaudid)  1 mg  Q3H  PRN


 SUBQ


 Severe Pain (Pain Scale 7-10)  2/21/20 13:15


 2/28/20 13:14   


 


 


 Oxycodone HCl


  (OxyCONTIN)  10 mg  EVERY 12  HOURS


 ORAL


   2/23/20 09:00


 2/28/20 20:59  2/24/20 08:34


 


 


 Pantoprazole


  (Protonix)  40 mg  EVERY 12  HOURS


 ORAL


   2/21/20 21:00


 3/22/20 20:59  2/24/20 08:33


 


 


 Sodium Chloride  1,000 ml @ 


 80 mls/hr  [id number]


 IV


   2/21/20 03:15


 3/22/20 03:14  2/23/20 21:11


 











Last 24 Hour Vital Signs








  Date Time  Temp Pulse Resp B/P (MAP) Pulse Ox O2 Delivery O2 Flow Rate FiO2


 


2/24/20 08:00 98.8 92 20 165/79 (107) 99   


 


2/24/20 07:41      Nasal Cannula 2.0 


 


2/24/20 04:00  97      


 


2/24/20 04:00 98.8 92 20 125/79 (94) 99   


 


2/24/20 04:00       2.0 


 


2/24/20 00:00  97      


 


2/24/20 00:00 98.9 93 20 139/79 (99) 95   


 


2/24/20 00:00       2.0 


 


2/23/20 21:57    162/90    


 


2/23/20 21:34  99 20  99 Nasal Cannula 2.0 28


 


2/23/20 21:24  99 20  96 Nasal Cannula 2.0 28


 


2/23/20 21:00      Nasal Cannula 2.0 


 


2/23/20 20:00       2.0 


 


2/23/20 20:00  97      


 


2/23/20 20:00 98.8 98 20 162/90 (114) 100   


 


2/23/20 19:40     97 Nasal Cannula 2.0 28


 


2/23/20 19:40  99 20  97 Nasal Cannula 2.0 28


 


2/23/20 16:00       2.0 


 


2/23/20 16:00 98.1 100 18 148/87 (107) 98   


 


2/23/20 15:12  105      


 


2/23/20 12:00 98.7 88 20 165/77 (106) 99   


 


2/23/20 12:00       2.0 


 


2/23/20 11:43  86      


 


2/23/20 09:00      Nasal Cannula 2.0 


 


2/23/20 08:00 98.6 90 21 168/82 (110) 95   


 


2/23/20 08:00       2.0 


 


2/23/20 07:45  89      


 


2/23/20 07:20  85 20  97 Nasal Cannula 2.0 28


 


2/23/20 07:20     97 Nasal Cannula 2.0 28


 


2/23/20 04:00       2.0 


 


2/23/20 04:00  97      


 


2/23/20 04:00 98.6 93 23 150/69 (96) 96   


 


2/23/20 00:00       2.0 


 


2/23/20 00:00  89      


 


2/23/20 00:00 97.8 92 23 149/80 (103) 95   


 


2/22/20 21:00      Nasal Cannula 2.0 


 


2/22/20 20:00  94      


 


2/22/20 20:00 97.7 97 22 155/84 (107) 95   


 


2/22/20 20:00       2.0 


 


2/22/20 19:59     96 Nasal Cannula 2.0 28


 


2/22/20 19:59  95 20  96 Nasal Cannula 2.0 28


 


2/22/20 16:00       2.0 


 


2/22/20 16:00 98.5 87 20 147/79 (101) 98   


 


2/22/20 15:25  85      


 


2/22/20 13:58    181/74    


 


2/22/20 12:00 98.9 73 19 181/74 (109) 97   


 


2/22/20 12:00       2.0 


 


2/22/20 11:30  93      


 


2/22/20 09:29  76 24  97   


 


2/22/20 08:46      Nasal Cannula 2.0 

















Intake and Output  


 


 2/23/20 2/24/20





 19:00 07:00


 


Intake Total 1270 ml 160 ml


 


Balance 1270 ml 160 ml


 


  


 


Intake Oral 550 ml 160 ml


 


IV Total 720 ml 


 


# Voids 3 2


 


# Bowel Movements 2 











Labs








Test


  2/21/20


10:10 2/23/20


18:30


 


White Blood Count


  8.6 K/UL


(4.8-10.8) 10.6 K/UL


(4.8-10.8)


 


Red Blood Count


  4.43 M/UL


(4.20-5.40) 4.39 M/UL


(4.20-5.40)


 


Hemoglobin


  11.8 G/DL


(12.0-16.0) 11.5 G/DL


(12.0-16.0)


 


Hematocrit


  37.0 %


(37.0-47.0) 37.0 %


(37.0-47.0)


 


Mean Corpuscular Volume 84 FL (80-99)  84 FL (80-99) 


 


Mean Corpuscular Hemoglobin


  26.6 PG


(27.0-31.0) 26.1 PG


(27.0-31.0)


 


Mean Corpuscular Hemoglobin


Concent 31.8 G/DL


(32.0-36.0) 31.0 G/DL


(32.0-36.0)


 


Red Cell Distribution Width


  14.4 %


(11.6-14.8) 16.4 %


(11.6-14.8)


 


Platelet Count


  255 K/UL


(150-450) 335 K/UL


(150-450)


 


Mean Platelet Volume


  5.1 FL


(6.5-10.1) 6.0 FL


(6.5-10.1)


 


Neutrophils (%) (Auto)


   % (45.0-75.0) 


  73.7 %


(45.0-75.0)


 


Lymphocytes (%) (Auto)


   % (20.0-45.0) 


  17.1 %


(20.0-45.0)


 


Monocytes (%) (Auto)


   % (1.0-10.0) 


  5.7 %


(1.0-10.0)


 


Eosinophils (%) (Auto)


   % (0.0-3.0) 


  2.5 %


(0.0-3.0)


 


Basophils (%) (Auto)


   % (0.0-2.0) 


  1.1 %


(0.0-2.0)


 


Differential Total Cells


Counted 100 


  


 


 


Neutrophils % (Manual) 69 % (45-75)  


 


Lymphocytes % (Manual) 24 % (20-45)  


 


Monocytes % (Manual) 6 % (1-10)  


 


Eosinophils % (Manual) 1 % (0-3)  


 


Basophils % (Manual) 0 % (0-2)  


 


Band Neutrophils 0 % (0-8)  


 


Platelet Estimate Adequate  


 


Platelet Morphology Normal  


 


Hypochromasia 1+  


 


Anisocytosis 1+  


 


Sodium Level


  146 MMOL/L


(136-145) 


 


 


Potassium Level


  4.0 MMOL/L


(3.5-5.1) 


 


 


Chloride Level


  111 MMOL/L


() 


 


 


Carbon Dioxide Level


  23 MMOL/L


(21-32) 


 


 


Anion Gap


  12 mmol/L


(5-15) 


 


 


Blood Urea Nitrogen


  18 mg/dL


(7-18) 


 


 


Creatinine


  0.9 MG/DL


(0.55-1.30) 


 


 


Estimat Glomerular Filtration


Rate > 60 mL/min


(>60) 


 


 


Glucose Level


  101 MG/DL


() 


 


 


Calcium Level


  8.5 MG/DL


(8.5-10.1) 


 








Height (Feet):  5


Height (Inches):  3.00


Weight (Pounds):  264


Objective





Physical Exam:   


Vitals: reviewed   


General: NAD   


HEENT: nc, at   


Neck: supple   


Chest: clear breath sounds bilaterally   


Cardiovascular: RRR, no s3, s4   


Abdomen: soft, nontender, nd   


Extremities: no cce, normal range of motion ++ ecchymoses right wrist s/p splint


Neuro: alert and oriented











Osmin Acevedo MD Feb 24, 2020 08:39

## 2020-02-24 NOTE — CARDIOLOGY PROGRESS NOTE
Assessment/Plan


Assessment/Plan


1. Shortness of breath, likely due to acute exacerbation of asthma, 2D 

echocardiography with normal LV systolic function with LVEF of 55%.  


2. Right distal radius fracture, s/p right wrist ORF, POD #3 with no 

perioperative cardiac events.











Time of note does not reflect the patient's encounter.





Subjective


Subjective


Sinus rhythm at rate of 92.


s/p right wrist ORIF, POD #3





Objective





Last 24 Hour Vital Signs








  Date Time  Temp Pulse Resp B/P (MAP) Pulse Ox O2 Delivery O2 Flow Rate FiO2


 


2/24/20 09:04 98.8       


 


2/24/20 08:00 98.8 92 20 165/79 (107) 99   


 


2/24/20 07:50  91      


 


2/24/20 07:41      Nasal Cannula 2.0 


 


2/24/20 04:00  97      


 


2/24/20 04:00 98.8 92 20 125/79 (94) 99   


 


2/24/20 04:00       2.0 


 


2/24/20 00:00  97      


 


2/24/20 00:00 98.9 93 20 139/79 (99) 95   


 


2/24/20 00:00       2.0 

















Intake and Output  


 


 2/23/20 2/24/20





 19:00 07:00


 


Intake Total 1270 ml 160 ml


 


Balance 1270 ml 160 ml


 


  


 


Intake Oral 550 ml 160 ml


 


IV Total 720 ml 


 


# Voids 3 2


 


# Bowel Movements 2 








2D Echo:  LVEF 55%, Grade I LVDD, Mild LVH, RVSP 25 mmHg


Objective


HEENT:  Atraumatic and normocephalic.  Anicteric.  Pupils are equal, round,


and reactive to light and accommodation.  Extraocular muscles intact.


NECK:  JVP less than 5 centimeter.  No carotid bruit.  Carotid upstrokes 2+


bilaterally.


CVS:  Normal S1 and S2.  Regular rate and rhythm.  No murmurs, gallops, or


rubs.  PMI is at fourth space in midclavicular line.


LUNGS:  Clear to auscultation bilaterally.


ABDOMEN:  Soft, nontender, and nondistended.  No hepatosplenomegaly.


Positive bowel sounds.


EXTREMITIES:  No evidence of edema, clubbing, or cyanosis.











Ajay Silva MD Feb 24, 2020 22:36

## 2020-02-24 NOTE — NUR
NURSE NOTES:

pt 95-99% on ra. pt refuses to go to snf multiple times risks and benefits informed to pt 
and  at bedside. rn left message to [facility] which provides staff to assist pt 
w picking up meds and accompanying pt to medical appts. left msg to Abby Hugh 
[phone]. pt and  aware that rn left voicemail. all belongings w the pt. pt/ 
refused to sign any form. 

Dr Nolasco aware of above. 

-------------------------------------------------------------------------------

Addendum: 02/24/20 at 1129 by KARINA NAYLOR RN

-------------------------------------------------------------------------------

RECOMMENDED TO PT RE FOLLOW UP W PRIMARY (PER CARETAKER, SHE HAS APPT ON MARCH 3.

## 2020-02-24 NOTE — NUR
*-* DISCHARGE PLANNING *-*



PATIENT HAS BEEN REFERRED TO:



JOYCE FLETCHER

P: [phone]

F: [phone]

EMAIL: [email or web address]

## 2020-02-25 ENCOUNTER — HOSPITAL ENCOUNTER (EMERGENCY)
Dept: HOSPITAL 72 - EMR | Age: 70
Discharge: HOME | End: 2020-02-25
Payer: MEDICARE

## 2020-02-25 VITALS — SYSTOLIC BLOOD PRESSURE: 180 MMHG | DIASTOLIC BLOOD PRESSURE: 85 MMHG

## 2020-02-25 VITALS — BODY MASS INDEX: 51.53 KG/M2 | WEIGHT: 280 LBS | HEIGHT: 62 IN

## 2020-02-25 DIAGNOSIS — I10: ICD-10-CM

## 2020-02-25 DIAGNOSIS — J44.1: ICD-10-CM

## 2020-02-25 DIAGNOSIS — J45.909: ICD-10-CM

## 2020-02-25 DIAGNOSIS — Z46.89: Primary | ICD-10-CM

## 2020-02-25 DIAGNOSIS — S52.501D: ICD-10-CM
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
## 2020-02-25 PROCEDURE — 29125 APPL SHORT ARM SPLINT STATIC: CPT

## 2020-02-25 PROCEDURE — 99284 EMERGENCY DEPT VISIT MOD MDM: CPT

## 2020-02-25 NOTE — NUR
ED Nurse Note:pt. had new splint placed on right arm per ER PA order, also 
received breathing treatment

## 2020-02-25 NOTE — EMERGENCY ROOM REPORT
History of Present Illness


General


Chief Complaint:  Upper Extremity Injury


Source:  Patient





Present Illness


HPI


70-year-old female presents to the emergency department complaining of having a 

loose fitting right forearm splint in addition to "something rubbing "on her 

inner arm.  Patient status post ORIF to the right forearm on the 21st due to 

distal radial fracture.  Patient ended up leaving AMA and left without any 

medications or discharge instructions.  Patient reports she has not followed up 

with an orthopedic specialist yet.  Patient does report that she has a physical 

therapy aide coming and seeing her.  She denies pain.  She denies additional 

trauma or fall.  She denies fevers, chills, paresthesias or skin color changes.

  Pt. with hx of significant COPD which is poorly controlled. She states she 

has not followed up with her PCP since recent hospital visit.


Allergies:  


Coded Allergies:  


     No Known Allergies (Unverified , 10/29/19)





Patient History


Past Medical History:  see triage record, COPD


Past Surgical History:  none


Pertinent Family History:  none


Last Menstrual Period:  na


Reviewed Nursing Documentation:  PMH: Agreed; PSxH: Agreed





Nursing Documentation-PMH


Past Medical History:  No History, Except For


Hx Cardiac Problems:  No


Hx Hypertension:  Yes


Hx Asthma:  Yes


Hx COPD:  Yes


Hx Cancer:  No


Hx Gastrointestinal Problems:  No


Hx Neurological Problems:  No





Review of Systems


All Other Systems:  negative except mentioned in HPI





Physical Exam





Vital Signs








  Date Time  Temp Pulse Resp B/P (MAP) Pulse Ox O2 Delivery O2 Flow Rate FiO2


 


2/25/20 16:49 97.9 106 20 191/88 (122) 96 Room Air  








Sp02 EP Interpretation:  reviewed, normal


General Appearance:  no apparent distress, alert, GCS 15, non-toxic, obese


Head:  normocephalic, atraumatic


Eyes:  bilateral eye normal inspection, bilateral eye PERRL


ENT:  hearing grossly normal, normal voice


Neck:  full range of motion


Respiratory:  chest non-tender, speaking full sentences, wheezing - expiratory


Cardiovascular #1:  regular rate, rhythm, no edema, normal capillary refill


Cardiovascular #2:  2+ radial (R)


Musculoskeletal:  back normal, normal range of motion, gait/station normal, 

other - pt with loose sugar tong splint of the right forearm. there is an 

exposed area which is rubbing on the pt. inner arm.  pt. NVI. swelling noted of 

the right hand/forearm.


Neurologic:  alert, motor strength/tone normal, oriented x3, sensory intact, 

responsive, speech normal, grossly normal, no focal defects


Psychiatric:  judgement/insight normal


Skin:  normal color, other - Mild dermatitis of the medial aspect of the right 

arm due to contact with splint material. erythema and superficial sensitivity 

to touch  to a small localized area.





Medical Decision Making


PA Attestation


Dr. Wheeler is my supervising Physician whom patient management has been 

discussed with.


Diagnostic Impression:  


 Primary Impression:  


 Problem with immobilizing cast


 Additional Impression:  


 COPD, frequent exacerbations


ER Course


70-year-old female presents to the emergency department complaining of having a 

loose fitting right forearm splint in addition to "something rubbing "on her 

inner arm.  Patient status post ORIF to the right forearm on the 21st due to 

distal radial fracture.  Patient ended up leaving AMA and left without any 

medications or discharge instructions.  Patient reports she has not followed up 

with an orthopedic specialist yet.  Patient does report that she has a physical 

therapy aide coming and seeing her.  She denies pain.  She denies additional 

trauma or fall.  She denies fevers, chills, paresthesias or skin color changes.

  Pt. with hx of significant COPD which is poorly controlled. She states she 

has not followed up with her PCP since recent hospital visit. 





Ddx considered but are not limited to Fracture, dislocation, contusion,  Sprain/

Strain/Spasm, compartment Syndrome, PNA, COPD/ Asthma exacerbation just to name 

a few. 





Vital signs: pt. mildly tachycardic at 106, otherwise remaining VS are WNL, pt. 

is afebrile, O2% Saturation is 96%





H&PE are most consistent with  s/p ORIF right forearm with a poorly fitting 

sugar tong splint.   COPD exacerbation persists. with audible wheezes. 


-Mild dermatitis of the medial aspect of the right arm due to contact with 

splint material.





ORDERS:





- None required at this time. 





ED INTERVENTIONS:





-  Albuterol HHN. 





-Right forearm volar splint applied by ED tech. Pt. remains neurovascularly 

intact.


-- right arm Sling reapplied  by ED tech. Pt. remains neurovascularly intact.








I discussed with this patient that it is imperative that she contact her 

orthopedist surgeon and coordinate proper follow-up as she is right-hand 

dominant.  Also discussed with patient that she needs to follow-up with her 

primary care provider for pulmonology referral since she continues to have 

symptoms of wheezing despite being on several medications for her COPD at home.





This patient is given strict ED return precautions.








DISCHARGE: At this time pt. is stable for d/c to home. Will provide printed 

patient care instructions, and any necessary prescriptions. Care plan and 

follow up instructions have been discussed with the patient prior to discharge.





Last Vital Signs








  Date Time  Temp Pulse Resp B/P (MAP) Pulse Ox O2 Delivery O2 Flow Rate FiO2


 


2/25/20 16:49 97.9 106 20 191/88 (122) 96 Room Air  








Status:  improved - improved comfort of right arm


Disposition:  HOME, SELF-CARE


Condition:  Stable


Scripts


Ibuprofen* (MOTRIN*) 600 Mg Tablet


600 MG ORAL THREE TIMES A DAY, #30 TAB 0 Refills


   Prov: Clementina Green         2/25/20 


Albuterol Sulfate* (ALBUTEROL SULFATE MDI*) 8.5 Gm Hfa.aer.ad


2 PUFF INH Q3H, #1 INH 0 Refills


   Prov: Clementina Green         2/25/20 


Methylprednisolone (Methylprednisolone*) 4MG Dspk


4 MG ORAL AS DIRECTED for 6 Days, #21 EA 0 Refills


   Day 1: Two tablets before breakfast, one after lunch, one after


   dinner, and two at bedtime. If started late in the day, take all six


   tablets at once or divide into two or three doses, unless otherwise


   directed by prescriber.


   Day 2: One tablet before breakfast, one after lunch, one after dinner,


   and two at bedtime


   Day 3: One tablet before breakfast, one after lunch, one after dinner,


   and one at bedtime


   Day 4: One tablet before breakfast, one after lunch, and one at


   bedtime


   Day 5: One tablet before breakfast and one at bedtime


   Day 6: One tablet before breakfast


   Prov: Clementina Green         2/25/20 


Oxycodone Hcl* (OXYCODONE HCL*) 5 Mg Capsule


5 MG ORAL Q6H PRN for For Pain, #12 CAP 0 Refills


   Prov: Clementina Green         2/25/20


Referrals:  


REYNA PERAZA Roman L. MD





[facility]


Patient Instructions:  Chronic Obstructive Pulmonary Disease Exacerbation, 

Radial Fracture





Additional Instructions:  


Take medications as directed. 





 ** Follow up with an ORTHOPEDIC SPECIALIST within 3 days, even if your 

symptoms have resolved. **  





 CONTACT INFO: For the orthopedic surgeon who performed your surgery, and the 

primary internal physician who was overseeing you have been provided either 

below or on the next page.  





- Follow up with PULMONOLOGIST for uncontrolled COPD.





Return sooner to ED if new symptoms occur, or current symptoms become worse. 





Do not drink alcohol, drive, or operate heavy machinery while taking Norco as 

this may cause drowsiness. 











- Please note that this Emergency Department Report was dictated using [employer] technology software, occasionally this can lead to 

erroneous entry secondary to interpretation by the dictation equipment.











Clementina Green Feb 25, 2020 18:09

## 2020-02-25 NOTE — DISCHARGE SUMMARY
Discharge Summary


Discharge Summary


_


DATE OF ADMISSION: 2/20/2020





DATE OF DISCHARGE: 2/24/2020








Patient left AGAINST MEDICAL ADVICE





REASON FOR ADMISSION: 


70 years old female with past medical history of COPD/asthma, presented after 

mechanical fall at home and pain in the right hand and forearm.  


Laboratory work-up revealed no leukocytosis,stable hemoglobin and hematocrit.  


Stable electrolytes and renal parameters. 


Troponin negative.  pro .  


Chest x-ray revealed  borderline cardiomegaly with mild interstitial 

congestion.  Possible small right pleural effusion.  


X-ray of the   right hand revealed was positive for distal radial fracture.  


X-ray of the right forearm revealed distal radial fracture as well.  


In emergency department well-padded splint was placed to right upper extremity.


Patient received analgesic.


Orthopedic surgeon consulted .


Clinical exam revealed mild wheezing . 


Patient received single dose of oral prednisone and bronchodilator treatment 

and subsequently admitted for further management.





CONSULTANTS:


cardiologist Dr. Silva 


pulmonary Dr. Montana


orthopedic surgery  [facility] 


Patient admitted to telemetry floor.  


Patient was seen by orthopedic surgeon.  


Per orthopedic surgery surgeon patient had right wrist  comminuted distal 

radius fracture.  


Echocardiogram demonstrated preserved ejection fraction 55 to 60% with no 

evidence of wall motion abnormality.  No evidence of pericardial effusion.  


Mild left ventricular hypertrophy.  Right ventricular systolic pressure 25.


Per cardiology,echocardiogram was stable.  Patient had no active cardiac 

issues. No further cardiac work-up  was required.





Patient subsequently undergone right wrist open reduction internal fixation of 

right distal radius fracture on 2/21. 


Pain management was addressed.  


Bowel regimen instituted


Supportive care provided.  


GI prophylaxis provided.  


Patient was working with  physical therapist.





Supplemental oxygen provided and titrated to keep pulse oximetry above 92%.  

Pulmonary toilet provided as needed.


Blood sugar was closely monitored remained stable.  


Diabetic diet provided.





Patient declined to go to skilled nursing facility.  


The risks and consequences of signing AGAINST MEDICAL ADVICE were discussed 

with patient in detail.  


Patient verbalized understanding.  


Patient declined to sign any form and left accompanied by her .





FINAL DIAGNOSES: 


Right distal radius fracture with comminution with displacement 


Status post open reduction internal fixation right wrist fracture


Bronchial asthma


Hypertension


Morbid obesity


Diabetes mellitus








 





I have been assigned to dictate discharge summary for this account. 


I was not involved in the patient's management.











Fern Garcia NP Feb 25, 2020 10:01

## 2020-02-25 NOTE — NUR
ER DISCHARGE NOTE:

Patient is cleared to be discharged per ERMD, pt is aox4, on room air, with 
stable vital signs. pt was given dc and prescription instructions, pt was able 
to verbalize understanding,pt left ER via wheelchair with family member pt took 
all belongings.

## 2020-03-06 ENCOUNTER — HOSPITAL ENCOUNTER (EMERGENCY)
Dept: HOSPITAL 72 - EMR | Age: 70
Discharge: HOME | End: 2020-03-06
Payer: MEDICARE

## 2020-03-06 VITALS — WEIGHT: 230 LBS | BODY MASS INDEX: 43.43 KG/M2 | HEIGHT: 61 IN

## 2020-03-06 VITALS — SYSTOLIC BLOOD PRESSURE: 122 MMHG | DIASTOLIC BLOOD PRESSURE: 73 MMHG

## 2020-03-06 VITALS — SYSTOLIC BLOOD PRESSURE: 97 MMHG | DIASTOLIC BLOOD PRESSURE: 70 MMHG

## 2020-03-06 DIAGNOSIS — I10: ICD-10-CM

## 2020-03-06 DIAGNOSIS — G89.18: ICD-10-CM

## 2020-03-06 DIAGNOSIS — K42.9: Primary | ICD-10-CM

## 2020-03-06 DIAGNOSIS — J44.9: ICD-10-CM

## 2020-03-06 LAB
APPEARANCE UR: CLEAR
APTT PPP: (no result) S
GLUCOSE UR STRIP-MCNC: NEGATIVE MG/DL
KETONES UR QL STRIP: NEGATIVE
LEUKOCYTE ESTERASE UR QL STRIP: NEGATIVE
NITRITE UR QL STRIP: NEGATIVE
PH UR STRIP: 5 [PH] (ref 4.5–8)
PROT UR QL STRIP: NEGATIVE
SP GR UR STRIP: 1.02 (ref 1–1.03)
UROBILINOGEN UR-MCNC: NORMAL MG/DL (ref 0–1)

## 2020-03-06 PROCEDURE — 81003 URINALYSIS AUTO W/O SCOPE: CPT

## 2020-03-06 PROCEDURE — 99284 EMERGENCY DEPT VISIT MOD MDM: CPT

## 2020-03-06 PROCEDURE — 96372 THER/PROPH/DIAG INJ SC/IM: CPT

## 2020-03-06 PROCEDURE — 74176 CT ABD & PELVIS W/O CONTRAST: CPT

## 2020-03-06 PROCEDURE — 73110 X-RAY EXAM OF WRIST: CPT

## 2020-03-06 PROCEDURE — 74150 CT ABDOMEN W/O CONTRAST: CPT

## 2020-03-06 NOTE — NUR
ED Nurse Note:



Pt cleared by ERMD for discharge.  DC instructions/prescription was given and 
explained to pt and daughter verbalized understanding of teachings. All medical 
deviecs such as ID band  removed. Pt is AAO x4, ambulatory and left with all 
personal belongings. Accompanied by her daughter.

## 2020-03-06 NOTE — DIAGNOSTIC IMAGING REPORT
Clinical Indication: Reason For Exam: PAIN

 

Technique:   No oral contrast utilized, per emergency room physician request no IV

contrast. Spiral acquisitions obtained through the abdomen. The pelvis was not

included due to patient body habitus. Multiplanar reconstructions were generated.

Total dose length product 883 mGycm. CTDIvol(s) 18 mGy. Dose reduction achieved using

automated exposure control

 

 

Comparison: none

 

Findings: Lack of IV contrast limits assessment of the solid organs. The liver,

gallbladder, bile ducts, pancreas, spleen, right adrenal, kidneys are unremarkable.

The left adrenal demonstrates an exophytic 16 mm nodule which demonstrates single

digit Hounsfield attenuation measurements. No renal or ureteral calculi,

hydronephrosis, nor hydroureter. No retroperitoneal or mesenteric mass or adenopathy.

 

There is a broad-based ventral hernia which extends beyond the imaging volume, may

contain small bowel although this is not certain this does not appear to result in

any small bowel distention. No definite free or loculated intraperitoneal gas or

fluid is evident.

 

Most of the chest is also included in the exam. The lungs demonstrate diffuse

groundglass opacity without evidence of focal infiltrate. The heart is mildly

enlarged.

 

The bones demonstrate degenerative spondylosis changes.

 

Impression: Very limited exam, as described

 

Ventral hernia, probably does not contain bowel but this cannot be stated for

certain. No evidence of obstruction, in any case

 

Left adrenal 16 mm adenoma graft diffuse pulmonary parenchymal groundglass opacity.

Probably due to atelectatic changes, component of edema also possible

 

Mild cardiomegaly

 

This essentially agrees with the preliminary interpretation provided overnight by

[facility] teleradiology service.

 

 

The CT scanner at [facility] is accredited by the [employer] College of

Radiology and the scans are performed using protocols designed to limit radiation

exposure to as low as reasonably achievable to attain images of sufficient resolution

adequate for diagnostic evaluation.

## 2020-03-06 NOTE — EMERGENCY ROOM REPORT
History of Present Illness


General


Chief Complaint:  Abdominal Pain


Source:  Patient, Family Member, Medical Record





Present Illness


HPI


This is a 70-year-old female with history of hypertension.  She also has 

previous  and appendectomy.  She had a recent wrist surgery.  She 

presents with chief complaint of abdominal pain.  Onset tonight.  Pain is 

around umbilical area.  Pain is 9 out of 10.  No nausea no vomiting.  No 

diarrhea.  Pain is sharp in nature.  No radiation.  She also complaining of her 

right hand being swollen from her splint.  Denies any new trauma.  Worse with 

movement.


Allergies:  


Coded Allergies:  


     No Known Allergies (Unverified , 10/29/19)





Patient History


Past Medical History:  see triage record, old chart reviewed, HTN, COPD


Past Surgical History:  appy, , other


Pertinent Family History:  none


Social History:  Denies: smoking


Pregnant Now:  No


Immunizations:  other


Reviewed Nursing Documentation:  PMH: Agreed; PSxH: Agreed





Nursing Documentation-PMH


Hx Cardiac Problems:  No


Hx Hypertension:  Yes


Hx Asthma:  Yes


Hx COPD:  Yes


Hx Cancer:  No


Hx Gastrointestinal Problems:  No


Hx Neurological Problems:  No





Review of Systems


Eye:  Denies: eye pain, blurred vision


ENT:  Denies: ear pain, nose congestion, throat swelling


Respiratory:  Denies: cough, shortness of breath


Cardiovascular:  Denies: chest pain, palpitations


Gastrointestinal:  Reports: abdominal pain; Denies: diarrhea, nausea, vomiting


Musculoskeletal:  Denies: back pain, joint pain


Skin:  Denies: rash


Neurological:  Denies: headache, numbness


Endocrine:  Denies: increased thirst, increased urine


Hematologic/Lymphatic:  Denies: easy bruising


All Other Systems:  negative except mentioned in HPI





Physical Exam





Vital Signs








  Date Time  Temp Pulse Resp B/P (MAP) Pulse Ox O2 Delivery O2 Flow Rate FiO2


 


3/6/20 02:00 98.2 72 20 97/70 (79) 99 Room Air  





Vitals unremarkable


Sp02 EP Interpretation:  reviewed, normal


General Appearance:  well appearing, no apparent distress, alert, obese


Head:  normocephalic, atraumatic


Eyes:  bilateral eye PERRL, bilateral eye EOMI


ENT:  hearing grossly normal, normal pharynx


Neck:  full range of motion, supple, no meningismus


Respiratory:  chest non-tender, lungs clear, normal breath sounds


Cardiovascular #1:  regular rate, rhythm, no murmur


Gastrointestinal:  normal bowel sounds, no mass, no organomegaly, no bruit, non-

distended, tenderness - Tender umbilical hernia mostly in the right side.


Musculoskeletal:  back normal, normal range of motion, gait/station normal, 

other - Right wrist: She has a volar splint on.  Hands show 3+ pitting edema.  

Her splint was on too tight with a Ace wrap.  Pulse normal.  Surgical wound 

looks clean.


Psychiatric:  mood/affect normal





Medical Decision Making


Diagnostic Impression:  


 Primary Impression:  


 Umbilical hernia


 Qualified Codes:  K42.9 - Umbilical hernia without obstruction or gangrene


 Additional Impression:  


 Post-op pain


ER Course


Patient presents with 2 issues.  The first 1 is her right wrist pain status 

post surgery.  She has swelling to her hand.  This was because she is having it 

dangling down in the Ace wrap was too tight.  I removed that and redo her 

splint.  There is no evidence of any infection.  X-ray looks fine.  No 

obstruction.





Second complaint is abdominal pain.  This is probably secondary to her 

incarcerated hernia.  It was reducible.  There was a pretty large defect just 

to the right side of umbilicus.  She was a very hard stick and after several 

attempts, she refused any more blood work done.  I did a CT scan instead.  CT 

scans unremarkable.  No evidence of any obstruction.  She felt better now after 

a pain shot.  She is drinking fluids without any problem.  Will discharge home.


Other X-Ray Diagnostic Results


Other X-Ray Diagnostic Results :  


   X-Ray ordered:  Right wrist x-rays


   # of Views/Limited Vs Complete:  3 View


   Indication:  Pain


   EP Interpretation:  Yes


   Interpretation:  no dislocation, no soft tissue swelling, other - Good 

alignment status post surgery


   Impression:  Other - status post distal radius surgery


   Electronically Signed by:  Alex Nuñez MD





CT/MRI/US Diagnostic Results


CT/MRI/US Diagnostic Results :  


   Imaging Test Ordered:  CT abdomen pelvis


   Impression


Read by radiologist.  No obstruction.





Last Vital Signs








  Date Time  Temp Pulse Resp B/P (MAP) Pulse Ox O2 Delivery O2 Flow Rate FiO2


 


3/6/20 02:00 98.2 72 20 97/70 (79) 99 Room Air  








Status:  improved


Disposition:  HOME, SELF-CARE


Condition:  Stable


Scripts


Hydrocodone Bit/Acetaminophen 5-325* (NORCO 5-325*) 1 Each Tablet


1 TAB ORAL Q6H PRN for For Pain, #20 TAB 0 Refills


   Prov: Alex Nuñez MD         3/6/20


Referrals:  


NON PHYSICIAN (PCP)





Additional Instructions:  


Follow-up with your orthopedic doctor for reevaluation.  Elevate arm.  Follow-

up with your doctor in 7 days.  You will need referral to see a surgeon 

regarding your hernia.  Return if worse.











Alex Nuñez MD Mar 6, 2020 02:28

## 2020-03-06 NOTE — NUR
ED Nurse Note:



Pt refused IV line insertion and blood draw. Explained risk and benefits, 
verbally understood. ERMD notified.

## 2020-03-06 NOTE — DIAGNOSTIC IMAGING REPORT
CT ABDOMEN:

 

Motion degraded study.

Negative for parenchymal lung consolidation or pneumothorax.

Indeterminate left adrenal nodule.  No acute findings involving the 

unenhanced solid abdominal organs.  The visualized portions of the lower 

GI tract demonstrate no obstruction or pneumatosis.  The pelvis was not 

imaged in this patient.  No loculated fluid collections in the abdomen.

## 2020-03-06 NOTE — DIAGNOSTIC IMAGING REPORT
Clinical Indication:Right wrist

 

Technique: 3 views of the right wrist

 

Comparison: 2/21/2020

 

Findings: Again noted are a plate and screws reducing a distal radial fracture. This

appears well aligned. The hardware appears intact, although the most distal lateral

screw does appear to be extraosseous.

 

Impression: Postoperative changes, as described. No definite acute abnormality

## 2020-03-06 NOTE — NUR
ED Nurse Note:



Norco 5/325mg wa snot given, ERMD cancelled the order. Medication was returned 
in the pyxis.

## 2020-03-06 NOTE — NUR
ED Nurse Note:



Patient walked in to ED from home c/o abdominal pain x2 hrs ago. Denies nausea, 
vomiting and diarrhea. Pt also reports right arm pain due S/P fracture sx last 
02/2020, noted with swollen hand. Pt took pepto bismol pta. Afebrile. Not in 
any distress. Daughter at bedside.

## 2020-03-24 NOTE — DIAGNOSTIC IMAGING REPORT
INDICATION:  Pain, intraoperative

 

TECHNIQUE: Intraoperative imaging

 

Fluoroscopy time: 14.2 seconds

Total dose: 0.05465 mGym2

Total number of images: 4

 

COMPARISON: 2/20/2020

 

FINDINGS: Intraoperative images document surgical repair of previously demonstrated

distal radial fracture with plate and screws

 

IMPRESSION: Intraoperative imaging, as described

## 2020-04-26 ENCOUNTER — HOSPITAL ENCOUNTER (EMERGENCY)
Dept: HOSPITAL 72 - EMR | Age: 70
Discharge: HOME | End: 2020-04-26
Payer: MEDICARE

## 2020-04-26 VITALS — SYSTOLIC BLOOD PRESSURE: 159 MMHG | DIASTOLIC BLOOD PRESSURE: 80 MMHG

## 2020-04-26 VITALS — WEIGHT: 260 LBS | HEIGHT: 62 IN | BODY MASS INDEX: 47.84 KG/M2

## 2020-04-26 VITALS — DIASTOLIC BLOOD PRESSURE: 91 MMHG | SYSTOLIC BLOOD PRESSURE: 171 MMHG

## 2020-04-26 DIAGNOSIS — K42.9: ICD-10-CM

## 2020-04-26 DIAGNOSIS — I10: ICD-10-CM

## 2020-04-26 DIAGNOSIS — K43.9: Primary | ICD-10-CM

## 2020-04-26 DIAGNOSIS — J44.9: ICD-10-CM

## 2020-04-26 DIAGNOSIS — E66.9: ICD-10-CM

## 2020-04-26 DIAGNOSIS — R05: ICD-10-CM

## 2020-04-26 PROCEDURE — 71045 X-RAY EXAM CHEST 1 VIEW: CPT

## 2020-04-26 PROCEDURE — 74176 CT ABD & PELVIS W/O CONTRAST: CPT

## 2020-04-26 PROCEDURE — 99284 EMERGENCY DEPT VISIT MOD MDM: CPT

## 2020-04-26 PROCEDURE — 96372 THER/PROPH/DIAG INJ SC/IM: CPT

## 2020-04-26 PROCEDURE — 93005 ELECTROCARDIOGRAM TRACING: CPT

## 2020-04-26 NOTE — EMERGENCY ROOM REPORT
History of Present Illness


General


Chief Complaint:  Abdominal Pain


Source:  Patient


 (Deniz Rowell MD)





Present Illness


HPI


Patient is a 70-year-old female who presents after increased epigastric pain.  

Prior history of ventral hernia as well as obesity.  Previous abdominal 

surgery.  Had recent ER visit and had previous ED imaging which showed a 

ventral hernia.  She denies any vomiting.  Reports having some nonproductive 

cough.


 (Deniz Rowell MD)


Allergies:  


Coded Allergies:  


     No Known Allergies (Unverified , 10/29/19)





COVID-19 Screening


Contact w/high risk pt:  No


Recent Travel to affected area:  No


Experienced COVID-19 symptoms?:  No


 (Deniz Rowell MD)





Patient History


Past Medical History:  see triage record


Last Menstrual Period:  na


Reviewed Nursing Documentation:  PMH: Agreed; PSxH: Agreed (Deniz Rowell MD)





Nursing Documentation-PMH


Hx Cardiac Problems:  No


Hx Hypertension:  Yes


Hx Asthma:  Yes


Hx COPD:  Yes


Hx Cancer:  No


Hx Gastrointestinal Problems:  No


Hx Neurological Problems:  No


 (Deniz Rowell MD)





Review of Systems


All Other Systems:  negative except mentioned in HPI


 (Deniz Rowell MD)





Physical Exam





Vital Signs








  Date Time  Temp Pulse Resp B/P (MAP) Pulse Ox O2 Delivery O2 Flow Rate FiO2


 


4/26/20 21:11 98.6 81 16 171/91 (117) 97 Room Air  








Sp02 EP Interpretation:  reviewed, normal


General Appearance:  alert, GCS 15, obese, Chronically Ill


Head:  atraumatic


ENT:  normal ENT inspection, hearing grossly normal, normal voice


Neck:  normal inspection, full range of motion, supple, no bony tend


Respiratory:  normal inspection, lungs clear, normal breath sounds, no 

respiratory distress, no retraction, no wheezing


Cardiovascular #1:  regular rate, rhythm, no edema


Gastrointestinal:  normal inspection, soft, no guarding, other - large hernia 

defect to lower abdomen and postsurgical changes


Genitourinary:  no CVA tenderness


Musculoskeletal:  normal inspection, back normal, normal range of motion


Neurologic:  alert, motor strength/tone normal, CNs III-XII nml as tested, 

oriented x3, responsive, speech normal, normal inspection


Psychiatric:  normal inspection, judgement/insight normal, mood/affect normal


Skin:  no rash


 (Deniz Rowell MD)





Medical Decision Making


Diagnostic Impression:  


 Primary Impression:  


 COPD (chronic obstructive pulmonary disease)


 Qualified Codes:  J44.9 - Chronic obstructive pulmonary disease, unspecified


 Additional Impression:  


 Ventral hernia


 Qualified Codes:  K43.9 - Ventral hernia without obstruction or gangrene


ER Course


Patient presented for abdominal pain.  Differential diagnosis include was not 

limited to incarcerated hernia, bowel obstruction, coronavirus infection, COPD, 

among others.  Because of complexity of patient's case CT imaging studies were 

ordered.Patient was endorsed to  pending CT imaging.


 (Deniz Rowell MD)


ER Course


This patient signed out to me.  She presents with abdominal pain.  She has a 

large umbilical/ventral hernia.  Very hard IV stick.  Ultrasound is 

unremarkable.  No change.  No evidence of any gangrene or obstruction.  No 

incarceration or strangulation.


 (Alex Nuñez MD)





Chest X-Ray Diagnostic Results


Chest X-Ray Diagnostic Results :  


   Chest X-Ray Ordered:  Yes


   # of Views/Limited/Complete:  1 View


   Indication:  Other


   EP Interpretation:  Yes


   Interpretation:  no consolidation, no effusion, no pneumothorax, other - copd


   Impression:  Other - no acute changes


   Electronically Signed by:  Alex Nuñez MD


 (Alex Nuñze MD)





CT/MRI/US Diagnostic Results


CT/MRI/US Diagnostic Results :  


   Imaging Test Ordered:  CT abdomen and pelvis


   Impression


Read by radiologist.  Unchanged appearing periumbilical hernia.  Prominent 

colonic stool burden.  Take lung attenuation could represent small airway 

disease.  Unchanged.


 (Alex Nuñez MD)





Last Vital Signs








  Date Time  Temp Pulse Resp B/P (MAP) Pulse Ox O2 Delivery O2 Flow Rate FiO2


 


4/26/20 21:11 98.6 81 16 171/91 (117) 97 Room Air  








Status:  unchanged


 (Deniz Rowell MD)


Status:  improved


 (Alex Nuñez MD)


Disposition:  HOME, SELF-CARE


Condition:  Stable


Scripts


Hydrocodone/Acetaminophen 5-325* (HYDROCODONE/ACETAMINOPHEN 5-325*) 1 Each 

Tablet


1 TAB ORAL Q6H PRN for For Pain, #20 TAB 0 Refills


   Prov: Alex Nuñez MD         4/26/20





Additional Instructions:  


Follow-up with your doctor in 7 days.  You may need a referral to see a surgeon 

for possible surgery.  Return if worse.











Deniz Rowell MD Apr 26, 2020 21:45


Alex Nuñez MD Apr 26, 2020 23:09

## 2020-04-26 NOTE — NUR
ED Nurse Note:

Pt ambulated to ED from home c/o abdominal pain with N x2hrs. Pt is A&Ox4, 
ambulates with assistance. Pt denies trauma but has a hx of hernias. Pt placed 
on cardiac monitor, unable to obtain IV, ERMD aware.

## 2020-04-26 NOTE — DIAGNOSTIC IMAGING REPORT
EXAM:

  XR Chest, 1 View

 

CLINICAL HISTORY:

  SOB

 

TECHNIQUE:

  Frontal view of the chest.

 

COMPARISON:

  2/20/20

 

FINDINGS:

  Lungs:  Low lung volumes with bronchovascular crowding.  No 

consolidation, pleural effusion, or pneumothorax.

  Pleural space:  See above.

  Heart:  Unremarkable.  No cardiomegaly.

  Mediastinum:  Unremarkable.

  Bones/joints:  Unremarkable.

 

IMPRESSION:     

1.  Low lung volumes with bronchovascular crowding.

2.  Otherwise no acute cardiopulmonary disease.

3.  If there is continued concern, recommend frontal and lateral chest 

radiographs or CT.

## 2020-04-26 NOTE — DIAGNOSTIC IMAGING REPORT
EXAM:

  CT Abdomen and Pelvis Without Intravenous Contrast

 

CLINICAL HISTORY:

  ABD PAIN

 

TECHNIQUE:

  Axial computed tomography images of the abdomen and pelvis without 

intravenous contrast.  CTDI is 23 mGy and DLP is 1312 mGy-cm.  One or 

more of the following dose reduction techniques were used: automated 

exposure control, adjustment of the mA and/or kV according to patient 

size, use of iterative reconstruction technique.

  Coronal and sagittal reformatted images were created and reviewed.

 

COMPARISON:

  3/6/2020

 

FINDINGS:

  Lung bases:  Mosaic lung attenuation could represent small airways 

disease.

 

 ABDOMEN:

  Liver:  Unremarkable.

  Gallbladder and bile ducts:  Unremarkable.  No calcified stones.  No 

ductal dilation.

  Pancreas:  Unremarkable.  No ductal dilation.

  Spleen:  Unremarkable.  No splenomegaly.

  Adrenals:  Unchanged left periadrenal 1.3cm nodularity of uncertain 

significance since prior study, correlate with remote imaging if 

available and if none available consider 3-6 month followup with CT 

adrenal gland.  Unremarkable right adrenal.

  Kidneys and ureters:  Unremarkable.  No obstructing stones.  No 

hydronephrosis.

  Stomach and bowel:  Prominent colonic stool burden, which could be a 

cause for pain.  Unchanged appearing periumbilical hernia with 2.9 cm 

mouth containing nonobstructed fluid-filled small bowel loops.  Mild fat 

stranding of the adjacent mesenteric and peritoneal fat is of uncertain 

significance, and could represent some component of inflammation.  

Ischemia thought unlikely given lack of pneumoperitoneum, perforation, 

and bowel loop dilation.  Correlate with presentation, and consider short 

interval follow-up imaging if there is continued clinical concern.  No 

mucosal thickening.

 

 PELVIS:

  Appendix:  No findings to suggest acute appendicitis.

  Bladder:  Unremarkable.  No stones.

  Reproductive:  Unremarkable as visualized.

 

 ABDOMEN and PELVIS:

  Intraperitoneal space:  Unremarkable.  No free air.  No significant 

fluid collection.

  Bones/joints:  Degenerative spine findings.  No acute fracture.  No 

dislocation.

  Soft tissues:  See above.

  Vasculature:  Unremarkable.  No abdominal aortic aneurysm.

  Lymph nodes:  Unremarkable.  No enlarged lymph nodes.

  Other findings:  ASVD.

 

IMPRESSION:     

1.  Unchanged appearing periumbilical hernia with 2.9 cm mouth containing 

nonobstructed fluid-filled small bowel loops.  Mild fat stranding of the 

adjacent mesenteric and peritoneal fat is of uncertain significance, and 

could represent some component of inflammation.  Ischemia thought 

unlikely given lack of pneumoperitoneum, perforation, and bowel loop 

dilation.  Correlate with presentation, and consider short interval 

follow-up imaging if there is continued clinical concern.

2.  Prominent colonic stool burden, which could be a cause for pain.

3.  Otherwise no acute abnormality to explain patient's presentation.

4.  Mosaic lung attenuation could represent small airways disease.

5.  Unchanged left periadrenal 1.3cm nodularity of uncertain significance 

since prior study, correlate with remote imaging if available and if none 

available consider 3-6 month followup with CT adrenal gland.

## 2020-06-12 ENCOUNTER — HOSPITAL ENCOUNTER (EMERGENCY)
Dept: HOSPITAL 72 - EMR | Age: 70
Discharge: HOME | End: 2020-06-12
Payer: MEDICARE

## 2020-06-12 VITALS — BODY MASS INDEX: 51.91 KG/M2 | HEIGHT: 63 IN | WEIGHT: 293 LBS

## 2020-06-12 VITALS — DIASTOLIC BLOOD PRESSURE: 76 MMHG | SYSTOLIC BLOOD PRESSURE: 132 MMHG

## 2020-06-12 VITALS — DIASTOLIC BLOOD PRESSURE: 72 MMHG | SYSTOLIC BLOOD PRESSURE: 124 MMHG

## 2020-06-12 VITALS — DIASTOLIC BLOOD PRESSURE: 84 MMHG | SYSTOLIC BLOOD PRESSURE: 127 MMHG

## 2020-06-12 DIAGNOSIS — I10: ICD-10-CM

## 2020-06-12 DIAGNOSIS — K43.9: Primary | ICD-10-CM

## 2020-06-12 DIAGNOSIS — J44.9: ICD-10-CM

## 2020-06-12 LAB
ADD MANUAL DIFF: YES
ALBUMIN SERPL-MCNC: 3.5 G/DL (ref 3.4–5)
ALBUMIN/GLOB SERPL: 0.7 {RATIO} (ref 1–2.7)
ALP SERPL-CCNC: 104 U/L (ref 46–116)
ALT SERPL-CCNC: 22 U/L (ref 12–78)
ANION GAP SERPL CALC-SCNC: 1 MMOL/L (ref 5–15)
AST SERPL-CCNC: 17 U/L (ref 15–37)
BILIRUB SERPL-MCNC: 0.3 MG/DL (ref 0.2–1)
BUN SERPL-MCNC: 11 MG/DL (ref 7–18)
CALCIUM SERPL-MCNC: 9.6 MG/DL (ref 8.5–10.1)
CHLORIDE SERPL-SCNC: 100 MMOL/L (ref 98–107)
CO2 SERPL-SCNC: 27 MMOL/L (ref 21–32)
CREAT SERPL-MCNC: 1.2 MG/DL (ref 0.55–1.3)
ERYTHROCYTE [DISTWIDTH] IN BLOOD BY AUTOMATED COUNT: 15.6 % (ref 11.6–14.8)
GLOBULIN SER-MCNC: 4.9 G/DL
HCT VFR BLD CALC: 46.5 % (ref 37–47)
HGB BLD-MCNC: 13.4 G/DL (ref 12–16)
MCV RBC AUTO: 90 FL (ref 80–99)
PLATELET # BLD: 410 K/UL (ref 150–450)
POTASSIUM SERPL-SCNC: 3.9 MMOL/L (ref 3.5–5.1)
RBC # BLD AUTO: 5.2 M/UL (ref 4.2–5.4)
SODIUM SERPL-SCNC: 127 MMOL/L (ref 136–145)
WBC # BLD AUTO: 15.4 K/UL (ref 4.8–10.8)

## 2020-06-12 PROCEDURE — 74176 CT ABD & PELVIS W/O CONTRAST: CPT

## 2020-06-12 PROCEDURE — 85007 BL SMEAR W/DIFF WBC COUNT: CPT

## 2020-06-12 PROCEDURE — 96361 HYDRATE IV INFUSION ADD-ON: CPT

## 2020-06-12 PROCEDURE — 80053 COMPREHEN METABOLIC PANEL: CPT

## 2020-06-12 PROCEDURE — 83690 ASSAY OF LIPASE: CPT

## 2020-06-12 PROCEDURE — 99284 EMERGENCY DEPT VISIT MOD MDM: CPT

## 2020-06-12 PROCEDURE — 96374 THER/PROPH/DIAG INJ IV PUSH: CPT

## 2020-06-12 PROCEDURE — 96375 TX/PRO/DX INJ NEW DRUG ADDON: CPT

## 2020-06-12 PROCEDURE — 36415 COLL VENOUS BLD VENIPUNCTURE: CPT

## 2020-06-12 PROCEDURE — 85025 COMPLETE CBC W/AUTO DIFF WBC: CPT

## 2020-06-12 NOTE — NUR
ED Nurse Note:



Patient was brought into the ER by her daughter via wheelchair with complaints 
of abdominal pain. Per patient pain is 10/10 and was worst at 1800. Cause of 
pain was unknown per patients daughter. Patient has a splint on her right hand 
which she fractured weeks ago. Patient is AA0x4. Patient placed on monitor bed.

## 2020-06-12 NOTE — NUR
ER DISCHARGE NOTE:



Patient is cleared to be discharged per ERMD, pt is aox4, on room air, with 
stable vital signs. pt was given dc and prescription instructions, pt was able 
to verbalize understanding, pt id band and iv site removed without 
complications. pt was wheeled to the waiting room via wheelchair.

## 2020-06-12 NOTE — NUR
ER DISCHARGE NOTE:



Tried to call relatives/daughter at  to  the patient. No 
answer. Left a voicemail

## 2020-06-12 NOTE — DIAGNOSTIC IMAGING REPORT
EXAM:

  CT Abdomen and Pelvis Without Intravenous Contrast

 

CLINICAL HISTORY:

  PAIN

 

TECHNIQUE:

  Axial computed tomography images of the abdomen and pelvis without 

intravenous contrast.  CTDI is 18.1 mGy and DLP is 967.90 mGy-cm.  One or 

more of the following dose reduction techniques were used: automated 

exposure control, adjustment of the mA and/or kV according to patient 

size, use of iterative reconstruction technique.

 

COMPARISON:

  CT abdomen and pelvis dated 4/26/2020

 

FINDINGS:

  Lung bases:  Bibasilar atelectasis.

  Heart:  Calcifications of the coronary arteries, mitral valve annulus, 

and aortic valve leaflets.

 

 ABDOMEN:

  Liver:  Unremarkable.

  Gallbladder and bile ducts:  Unremarkable.

  Pancreas:  Unremarkable.

  Spleen:  Unremarkable.

  Adrenals:  Unremarkable.

  Kidneys and ureters:  Unremarkable.

  Stomach and bowel:  There are a few dilated loops of small bowel are 

seen within a right lower quadrant ventral wall hernia.  There is mild 

edema seen within the hernia sac.  Findings may represent early 

incarceration.  No pneumatosis or free air.

 

 PELVIS:

  Appendix:  Appendix is unremarkable.

  Bladder:  Unremarkable.

  Reproductive:  Unremarkable as visualized.

 

 ABDOMEN and PELVIS:

  Intraperitoneal space:  See above.

  Bones/joints:  No acute fracture.  No dislocation.

  Soft tissues:  See above.

  Vasculature:  Vascular calcifications.

  Lymph nodes:  Unremarkable.

 

IMPRESSION:     

  There are a few dilated loops of small bowel are seen within a right 

lower quadrant ventral wall hernia.  There is mild edema seen within the 

hernia sac.  Findings may represent early incarceration.  No pneumatosis 

or free air.

 

<MYCVCSECTION>

 

Communications:

 

06/12/20 23:08 Verify Receipt Verified receipt with CARROL Sorto, given 

to  Alex Nuñez MD on 06/12 23:08 (-07:00)

## 2020-06-12 NOTE — EMERGENCY ROOM REPORT
History of Present Illness


General


Chief Complaint:  Abdominal Pain





Present Illness


HPI


70-year-old female history of abdominal wall hernia presented with abdominal 

pain nausea and vomiting.  Symptoms present for 1 day.  She has a history of 

abdominal wall hernia causing similar pain in the past.  Patient is a very poor 

historian and history was taken from the daughter.


Allergies:  


Coded Allergies:  


     No Known Allergies (Unverified , 10/29/19)





COVID-19 Screening


Contact w/high risk pt:  No


Recent Travel to affected area:  No


Experienced COVID-19 symptoms?:  No


COVID-19 Testing performed PTA:  No





Patient History


Reviewed Nursing Documentation:  PMH: Agreed; PSxH: Agreed





Nursing Documentation-PMH


Hx Cardiac Problems:  No


Hx Hypertension:  Yes


Hx Asthma:  Yes


Hx COPD:  Yes


Hx Cancer:  No


Hx Gastrointestinal Problems:  No


Hx Neurological Problems:  No





Review of Systems


All Other Systems:  negative except mentioned in HPI





Physical Exam





Vital Signs








  Date Time  Temp Pulse Resp B/P (MAP) Pulse Ox O2 Delivery O2 Flow Rate FiO2


 


6/12/20 20:47   20  96 Room Air  


 


6/12/20 20:50  88      


 


6/12/20 20:50 98.0   132/76    








Sp02 EP Interpretation:  reviewed, normal


General Appearance:  well appearing, Chronically Ill


Head:  normocephalic, atraumatic


Eyes:  bilateral eye PERRL, bilateral eye EOMI


ENT:  hearing grossly normal, moist mucus membranes


Neck:  full range of motion, supple


Respiratory:  lungs clear, normal breath sounds, no rhonchi, no respiratory 

distress, no retraction, no wheezing


Cardiovascular #1:  normal peripheral pulses, regular rate, rhythm, no murmur


Gastrointestinal:  soft, non-distended, no guarding, other - Incisional versus 

umbilical hernia noted in lower abdominal wall firm to touch


Neurologic:  alert, oriented x3, no focal defects


Skin:  normal color, warm/dry





Procedures


Additional Procedure


Procedure Narrative


Hernia reduction.


Verbal consent obtained from patient, using pressure and hernia was reduced in 

the periumbilical region.  Patient tolerated procedure well without 

complication.





Medical Decision Making


Diagnostic Impression:  


 Primary Impression:  


 Hernia


ER Course


MDM: Differential included but not limited to abdominal wall hernia, 

incarcerated hernia, gastroenteritis to name a few





Clinical course-on exam patient did have a palpable hernia in the abdominal 

wall which was reduced at bedside.  Pain was dramatically improved after this.  

Laboratory studies did demonstrate mild leukocytosis.  I suspect due to 

inflammation from early hernia incarceration.  CT scan did demonstrate evidence 

of incarcerated hernia again which was via bedside.  After reduction patient 

had no active vomiting pain was controlled will be discharged with instructions 

to follow-up outpatient and given return precautions.





Labs - 


Laboratory Tests








Test


  6/12/20


21:20


 


White Blood Count


  15.4 K/UL


(4.8-10.8)  H


 


Red Blood Count


  5.20 M/UL


(4.20-5.40)


 


Hemoglobin


  13.4 G/DL


(12.0-16.0)


 


Hematocrit


  46.5 %


(37.0-47.0)


 


Mean Corpuscular Volume 90 FL (80-99)  


 


Mean Corpuscular Hemoglobin


  25.8 PG


(27.0-31.0)  L


 


Mean Corpuscular Hemoglobin


Concent 28.8 G/DL


(32.0-36.0)  L


 


Red Cell Distribution Width


  15.6 %


(11.6-14.8)  H


 


Platelet Count


  410 K/UL


(150-450)


 


Mean Platelet Volume


  5.5 FL


(6.5-10.1)  L


 


Neutrophils (%) (Auto)


  % (45.0-75.0)


 


 


Lymphocytes (%) (Auto)


  % (20.0-45.0)


 


 


Monocytes (%) (Auto)  % (1.0-10.0)  


 


Eosinophils (%) (Auto)  % (0.0-3.0)  


 


Basophils (%) (Auto)  % (0.0-2.0)  


 


Neutrophils % (Manual) Pending  


 


Lymphocytes % (Manual) Pending  


 


Platelet Estimate Pending  


 


Platelet Morphology Pending  


 


Sodium Level


  127 MMOL/L


(136-145)  L


 


Potassium Level


  3.9 MMOL/L


(3.5-5.1)


 


Chloride Level


  100 MMOL/L


()


 


Carbon Dioxide Level


  27 MMOL/L


(21-32)


 


Anion Gap


  1 mmol/L


(5-15)  L


 


Blood Urea Nitrogen


  11 mg/dL


(7-18)


 


Creatinine


  1.2 MG/DL


(0.55-1.30)


 


Estimated Glomerular


Filtration Rate 44.4 mL/min


(>60)


 


Glucose Level


  141 MG/DL


()  H


 


Calcium Level


  9.6 MG/DL


(8.5-10.1)


 


Total Bilirubin


  0.3 MG/DL


(0.2-1.0)


 


Aspartate Amino Transferase


(AST) 17 U/L (15-37)


 


 


Alanine Aminotransferase (ALT)


  22 U/L (12-78)


 


 


Alkaline Phosphatase


  104 U/L


()


 


Total Protein


  8.4 G/DL


(6.4-8.2)  H


 


Albumin


  3.5 G/DL


(3.4-5.0)


 


Globulin 4.9 g/dL  


 


Albumin/Globulin Ratio


  0.7 (1.0-2.7)


L


 


Lipase


  69 U/L


()  L











On reevaluation: Pain controlled, patient in no acute distress








Plan-discharge home with outpatient follow-up with PMD.





Last Vital Signs








  Date Time  Temp Pulse Resp B/P (MAP) Pulse Ox O2 Delivery O2 Flow Rate FiO2


 


6/12/20 22:52 98.0 83 20 127/84 99 Room Air  








Disposition:  HOME, SELF-CARE


Condition:  Improved


Scripts


Hydrocodone/Acetaminophen 5-325* (HYDROCODONE/ACETAMINOPHEN 5-325*) 1 Each 

Tablet


1 TAB ORAL Q6H PRN for For Pain, #20 TAB 0 Refills


   Prov: Elias Singh M.D.         6/12/20


Patient Instructions:  Hernia, Adult, Easy-to-Read





Additional Instructions:  


Patient is instructed to follow-up with her primary care doctor, primary care 

clinic or [facility] clinic in 1 to 2 days.  Patient instructed to return for any 

worsening symptoms or concerns.


Disclaimer: Please note that this report is being documented using [employer] 

technology. This can lead to erroneous entry secondary to incorrect 

interpretation by the dictating instrument.











Elias Singh M.D. Jun 12, 2020 23:11

## 2021-02-17 NOTE — NUR
NURSE NOTES:

Re assesed pt's bp after admin of PRN hydralazine as ordered. /89, will continue to 
monitor closely. (3) no apparent problem No

## 2022-11-11 ENCOUNTER — HOSPITAL ENCOUNTER (EMERGENCY)
Dept: HOSPITAL 87 - ER | Age: 72
LOS: 1 days | Discharge: LEFT BEFORE DISCHARGE | End: 2022-11-12
Payer: MEDICARE

## 2022-11-11 VITALS — SYSTOLIC BLOOD PRESSURE: 247 MMHG | DIASTOLIC BLOOD PRESSURE: 135 MMHG

## 2022-11-11 VITALS — BODY MASS INDEX: 36.92 KG/M2 | WEIGHT: 200.62 LBS | HEIGHT: 62 IN

## 2022-11-11 DIAGNOSIS — Z53.21: Primary | ICD-10-CM

## 2022-11-11 PROCEDURE — 99281 EMR DPT VST MAYX REQ PHY/QHP: CPT

## 2025-03-28 NOTE — NUR
ED Nurse Note:

Pt to CT Ji Santo  [facility]  [address]ULED [address]  Scheduled Appointment: 04/08/2025    Dharmesh France  [facility]  [facility] [address]  Scheduled Appointment: 04/21/2025    Ji Santo  [facility]  [address] [facility] [address]  Scheduled Appointment: 05/05/2025